# Patient Record
Sex: MALE | Race: WHITE | Employment: FULL TIME | URBAN - METROPOLITAN AREA
[De-identification: names, ages, dates, MRNs, and addresses within clinical notes are randomized per-mention and may not be internally consistent; named-entity substitution may affect disease eponyms.]

---

## 2017-05-25 NOTE — Clinical Note
HFU completed. A HFU appointment is scheduled with the [facility] on 1/21/2022. Thank you. None known

## 2022-01-04 ENCOUNTER — APPOINTMENT (OUTPATIENT)
Dept: CT IMAGING | Age: 42
DRG: 872 | End: 2022-01-04
Attending: EMERGENCY MEDICINE
Payer: COMMERCIAL

## 2022-01-04 ENCOUNTER — HOSPITAL ENCOUNTER (INPATIENT)
Facility: HOSPITAL | Age: 42
LOS: 1 days | Discharge: HOME | DRG: 872 | End: 2022-01-05
Attending: EMERGENCY MEDICINE | Admitting: HOSPITALIST
Payer: COMMERCIAL

## 2022-01-04 DIAGNOSIS — R73.9 HYPERGLYCEMIA: Primary | ICD-10-CM

## 2022-01-04 DIAGNOSIS — E11.9 TYPE 2 DIABETES MELLITUS WITHOUT COMPLICATION, UNSPECIFIED WHETHER LONG TERM INSULIN USE (HCC): ICD-10-CM

## 2022-01-04 DIAGNOSIS — R74.01 ELEVATED TRANSAMINASE LEVEL: ICD-10-CM

## 2022-01-04 DIAGNOSIS — K04.7 DENTAL ABSCESS: ICD-10-CM

## 2022-01-04 PROBLEM — L02.01 FACIAL ABSCESS: Status: ACTIVE | Noted: 2022-01-04

## 2022-01-04 LAB
ALBUMIN SERPL-MCNC: 3.4 G/DL (ref 3.4–5)
ALBUMIN/GLOB SERPL: 0.8 {RATIO} (ref 1–2)
ALP LIVER SERPL-CCNC: 173 U/L
ALT SERPL-CCNC: 107 U/L
ANION GAP SERPL CALC-SCNC: 12 MMOL/L (ref 0–18)
APTT PPP: 24.1 SECONDS (ref 23.3–35.6)
AST SERPL-CCNC: 81 U/L (ref 15–37)
BASOPHILS # BLD: 0 X10(3) UL (ref 0–0.2)
BASOPHILS NFR BLD: 0 %
BILIRUB SERPL-MCNC: 1.5 MG/DL (ref 0.1–2)
BUN BLD-MCNC: 14 MG/DL (ref 7–18)
CALCIUM BLD-MCNC: 9.3 MG/DL (ref 8.5–10.1)
CHLORIDE SERPL-SCNC: 99 MMOL/L (ref 98–112)
CO2 SERPL-SCNC: 20 MMOL/L (ref 21–32)
CREAT BLD-MCNC: 1.3 MG/DL
EOSINOPHIL # BLD: 0 X10(3) UL (ref 0–0.7)
EOSINOPHIL NFR BLD: 0 %
ERYTHROCYTE [DISTWIDTH] IN BLOOD BY AUTOMATED COUNT: 13.3 %
GLOBULIN PLAS-MCNC: 4.5 G/DL (ref 2.8–4.4)
GLUCOSE BLD-MCNC: 311 MG/DL (ref 70–99)
GLUCOSE BLD-MCNC: 320 MG/DL (ref 70–99)
HCT VFR BLD AUTO: 49.9 %
HGB BLD-MCNC: 16.4 G/DL
INR BLD: 1.1 (ref 0.8–1.2)
LACTATE SERPL-SCNC: 1.2 MMOL/L (ref 0.4–2)
LYMPHOCYTES NFR BLD: 1.63 X10(3) UL (ref 1–4)
LYMPHOCYTES NFR BLD: 12 %
MCH RBC QN AUTO: 28.4 PG (ref 26–34)
MCHC RBC AUTO-ENTMCNC: 32.9 G/DL (ref 31–37)
MCV RBC AUTO: 86.5 FL
MONOCYTES # BLD: 0.54 X10(3) UL (ref 0.1–1)
MONOCYTES NFR BLD: 4 %
MORPHOLOGY: NORMAL
NEUTROPHILS # BLD AUTO: 11.55 X10 (3) UL (ref 1.5–7.7)
NEUTROPHILS NFR BLD: 83 %
NEUTS BAND NFR BLD: 1 %
NEUTS HYPERSEG # BLD: 11.42 X10(3) UL (ref 1.5–7.7)
OSMOLALITY SERPL CALC.SUM OF ELEC: 284 MOSM/KG (ref 275–295)
PLATELET # BLD AUTO: 205 10(3)UL (ref 150–450)
PLATELET MORPHOLOGY: NORMAL
POTASSIUM SERPL-SCNC: 3.9 MMOL/L (ref 3.5–5.1)
PROT SERPL-MCNC: 7.9 G/DL (ref 6.4–8.2)
PROTHROMBIN TIME: 14 SECONDS (ref 11.6–14.8)
RBC # BLD AUTO: 5.77 X10(6)UL
SARS-COV-2 RNA RESP QL NAA+PROBE: NOT DETECTED
SODIUM SERPL-SCNC: 131 MMOL/L (ref 136–145)
TOTAL CELLS COUNTED BLD: 100
WBC # BLD AUTO: 13.6 X10(3) UL (ref 4–11)

## 2022-01-04 PROCEDURE — 70491 CT SOFT TISSUE NECK W/DYE: CPT | Performed by: EMERGENCY MEDICINE

## 2022-01-04 PROCEDURE — 99223 1ST HOSP IP/OBS HIGH 75: CPT | Performed by: HOSPITALIST

## 2022-01-04 PROCEDURE — 3046F HEMOGLOBIN A1C LEVEL >9.0%: CPT | Performed by: NURSE PRACTITIONER

## 2022-01-04 RX ORDER — KETOROLAC TROMETHAMINE 15 MG/ML
15 INJECTION, SOLUTION INTRAMUSCULAR; INTRAVENOUS ONCE
Status: COMPLETED | OUTPATIENT
Start: 2022-01-04 | End: 2022-01-04

## 2022-01-04 RX ORDER — IBUPROFEN 600 MG/1
600 TABLET ORAL ONCE
Status: COMPLETED | OUTPATIENT
Start: 2022-01-04 | End: 2022-01-04

## 2022-01-04 RX ORDER — SODIUM CHLORIDE 9 MG/ML
INJECTION, SOLUTION INTRAVENOUS ONCE
Status: COMPLETED | OUTPATIENT
Start: 2022-01-04 | End: 2022-01-04

## 2022-01-04 RX ORDER — INSULIN ASPART 100 [IU]/ML
0.15 INJECTION, SOLUTION INTRAVENOUS; SUBCUTANEOUS ONCE
Status: COMPLETED | OUTPATIENT
Start: 2022-01-04 | End: 2022-01-04

## 2022-01-04 RX ORDER — ACETAMINOPHEN 500 MG
1000 TABLET ORAL ONCE
Status: COMPLETED | OUTPATIENT
Start: 2022-01-04 | End: 2022-01-04

## 2022-01-04 RX ORDER — CLINDAMYCIN PHOSPHATE 600 MG/50ML
600 INJECTION INTRAVENOUS ONCE
Status: COMPLETED | OUTPATIENT
Start: 2022-01-04 | End: 2022-01-04

## 2022-01-05 ENCOUNTER — APPOINTMENT (OUTPATIENT)
Dept: CV DIAGNOSTICS | Facility: HOSPITAL | Age: 42
DRG: 872 | End: 2022-01-05
Attending: HOSPITALIST
Payer: COMMERCIAL

## 2022-01-05 VITALS
HEIGHT: 73 IN | WEIGHT: 278 LBS | OXYGEN SATURATION: 100 % | SYSTOLIC BLOOD PRESSURE: 133 MMHG | HEART RATE: 88 BPM | DIASTOLIC BLOOD PRESSURE: 71 MMHG | RESPIRATION RATE: 16 BRPM | BODY MASS INDEX: 36.84 KG/M2 | TEMPERATURE: 99 F

## 2022-01-05 PROBLEM — R74.01 ELEVATED TRANSAMINASE LEVEL: Status: ACTIVE | Noted: 2022-01-05

## 2022-01-05 PROBLEM — K04.7 DENTAL ABSCESS: Status: ACTIVE | Noted: 2022-01-05

## 2022-01-05 PROBLEM — R73.9 HYPERGLYCEMIA: Status: ACTIVE | Noted: 2022-01-05

## 2022-01-05 LAB
ALBUMIN SERPL-MCNC: 2.8 G/DL (ref 3.4–5)
ALBUMIN/GLOB SERPL: 0.8 {RATIO} (ref 1–2)
ALP LIVER SERPL-CCNC: 132 U/L
ALT SERPL-CCNC: 83 U/L
ANION GAP SERPL CALC-SCNC: 9 MMOL/L (ref 0–18)
AST SERPL-CCNC: 50 U/L (ref 15–37)
BASOPHILS # BLD AUTO: 0.09 X10(3) UL (ref 0–0.2)
BASOPHILS NFR BLD AUTO: 0.7 %
BILIRUB SERPL-MCNC: 1.2 MG/DL (ref 0.1–2)
BILIRUB UR QL STRIP.AUTO: NEGATIVE
BUN BLD-MCNC: 11 MG/DL (ref 7–18)
CALCIUM BLD-MCNC: 8.2 MG/DL (ref 8.5–10.1)
CHLORIDE SERPL-SCNC: 109 MMOL/L (ref 98–112)
CLARITY UR REFRACT.AUTO: CLEAR
CO2 SERPL-SCNC: 19 MMOL/L (ref 21–32)
COLOR UR AUTO: YELLOW
CREAT BLD-MCNC: 0.67 MG/DL
EOSINOPHIL # BLD AUTO: 0.09 X10(3) UL (ref 0–0.7)
EOSINOPHIL NFR BLD AUTO: 0.7 %
ERYTHROCYTE [DISTWIDTH] IN BLOOD BY AUTOMATED COUNT: 13.3 %
EST. AVERAGE GLUCOSE BLD GHB EST-MCNC: 229 MG/DL (ref 68–126)
GLOBULIN PLAS-MCNC: 3.5 G/DL (ref 2.8–4.4)
GLUCOSE BLD-MCNC: 170 MG/DL (ref 70–99)
GLUCOSE BLD-MCNC: 175 MG/DL (ref 70–99)
GLUCOSE BLD-MCNC: 183 MG/DL (ref 70–99)
GLUCOSE BLD-MCNC: 202 MG/DL (ref 70–99)
GLUCOSE UR STRIP.AUTO-MCNC: >=500 MG/DL
HBA1C MFR BLD: 9.6 % (ref ?–5.7)
HCT VFR BLD AUTO: 44 %
HGB BLD-MCNC: 14.8 G/DL
IMM GRANULOCYTES # BLD AUTO: 0.32 X10(3) UL (ref 0–1)
IMM GRANULOCYTES NFR BLD: 2.4 %
KETONES UR STRIP.AUTO-MCNC: 20 MG/DL
LEUKOCYTE ESTERASE UR QL STRIP.AUTO: NEGATIVE
LYMPHOCYTES # BLD AUTO: 1.53 X10(3) UL (ref 1–4)
LYMPHOCYTES NFR BLD AUTO: 11.6 %
MCH RBC QN AUTO: 28.6 PG (ref 26–34)
MCHC RBC AUTO-ENTMCNC: 33.6 G/DL (ref 31–37)
MCV RBC AUTO: 85.1 FL
MONOCYTES # BLD AUTO: 0.88 X10(3) UL (ref 0.1–1)
MONOCYTES NFR BLD AUTO: 6.7 %
NEUTROPHILS # BLD AUTO: 10.23 X10 (3) UL (ref 1.5–7.7)
NEUTROPHILS # BLD AUTO: 10.23 X10(3) UL (ref 1.5–7.7)
NEUTROPHILS NFR BLD AUTO: 77.9 %
NITRITE UR QL STRIP.AUTO: NEGATIVE
OSMOLALITY SERPL CALC.SUM OF ELEC: 289 MOSM/KG (ref 275–295)
PH UR STRIP.AUTO: 6 [PH] (ref 5–8)
PLATELET # BLD AUTO: 167 10(3)UL (ref 150–450)
POTASSIUM SERPL-SCNC: 4.1 MMOL/L (ref 3.5–5.1)
PROT SERPL-MCNC: 6.3 G/DL (ref 6.4–8.2)
PROT UR STRIP.AUTO-MCNC: NEGATIVE MG/DL
RBC # BLD AUTO: 5.17 X10(6)UL
SODIUM SERPL-SCNC: 137 MMOL/L (ref 136–145)
SP GR UR STRIP.AUTO: 1.03 (ref 1–1.03)
UROBILINOGEN UR STRIP.AUTO-MCNC: <2 MG/DL
WBC # BLD AUTO: 13.1 X10(3) UL (ref 4–11)

## 2022-01-05 PROCEDURE — 93306 TTE W/DOPPLER COMPLETE: CPT | Performed by: HOSPITALIST

## 2022-01-05 PROCEDURE — 99239 HOSP IP/OBS DSCHRG MGMT >30: CPT | Performed by: HOSPITALIST

## 2022-01-05 RX ORDER — CLINDAMYCIN HYDROCHLORIDE 300 MG/1
300 CAPSULE ORAL 4 TIMES DAILY
Qty: 28 CAPSULE | Refills: 0 | Status: SHIPPED | OUTPATIENT
Start: 2022-01-05 | End: 2022-01-15

## 2022-01-05 RX ORDER — PROCHLORPERAZINE EDISYLATE 5 MG/ML
5 INJECTION INTRAMUSCULAR; INTRAVENOUS EVERY 8 HOURS PRN
Status: DISCONTINUED | OUTPATIENT
Start: 2022-01-05 | End: 2022-01-05

## 2022-01-05 RX ORDER — MORPHINE SULFATE 2 MG/ML
2 INJECTION, SOLUTION INTRAMUSCULAR; INTRAVENOUS EVERY 2 HOUR PRN
Status: DISCONTINUED | OUTPATIENT
Start: 2022-01-05 | End: 2022-01-05

## 2022-01-05 RX ORDER — CLINDAMYCIN HYDROCHLORIDE 300 MG/1
300 CAPSULE ORAL 4 TIMES DAILY
Qty: 28 CAPSULE | Refills: 0 | Status: SHIPPED | OUTPATIENT
Start: 2022-01-05 | End: 2022-01-05

## 2022-01-05 RX ORDER — BLOOD SUGAR DIAGNOSTIC
STRIP MISCELLANEOUS
Qty: 50 STRIP | Refills: 6 | Status: ON HOLD | OUTPATIENT
Start: 2022-01-05 | End: 2022-01-11 | Stop reason: CLARIF

## 2022-01-05 RX ORDER — SODIUM CHLORIDE 9 MG/ML
INJECTION, SOLUTION INTRAVENOUS CONTINUOUS
Status: DISCONTINUED | OUTPATIENT
Start: 2022-01-05 | End: 2022-01-05

## 2022-01-05 RX ORDER — MORPHINE SULFATE 4 MG/ML
4 INJECTION, SOLUTION INTRAMUSCULAR; INTRAVENOUS EVERY 2 HOUR PRN
Status: DISCONTINUED | OUTPATIENT
Start: 2022-01-05 | End: 2022-01-05

## 2022-01-05 RX ORDER — BLOOD-GLUCOSE METER
EACH MISCELLANEOUS
Qty: 1 KIT | Refills: 0 | Status: SHIPPED | OUTPATIENT
Start: 2022-01-05 | End: 2022-01-07 | Stop reason: CLARIF

## 2022-01-05 RX ORDER — ACETAMINOPHEN 325 MG/1
650 TABLET ORAL EVERY 6 HOURS PRN
Status: DISCONTINUED | OUTPATIENT
Start: 2022-01-05 | End: 2022-01-05

## 2022-01-05 RX ORDER — ONDANSETRON 2 MG/ML
4 INJECTION INTRAMUSCULAR; INTRAVENOUS EVERY 6 HOURS PRN
Status: DISCONTINUED | OUTPATIENT
Start: 2022-01-05 | End: 2022-01-05

## 2022-01-05 RX ORDER — MORPHINE SULFATE 2 MG/ML
1 INJECTION, SOLUTION INTRAMUSCULAR; INTRAVENOUS EVERY 2 HOUR PRN
Status: DISCONTINUED | OUTPATIENT
Start: 2022-01-05 | End: 2022-01-05

## 2022-01-05 RX ORDER — DEXTROSE MONOHYDRATE 25 G/50ML
50 INJECTION, SOLUTION INTRAVENOUS
Status: DISCONTINUED | OUTPATIENT
Start: 2022-01-05 | End: 2022-01-05

## 2022-01-05 RX ORDER — KETOROLAC TROMETHAMINE 15 MG/ML
15 INJECTION, SOLUTION INTRAMUSCULAR; INTRAVENOUS EVERY 6 HOURS PRN
Status: DISCONTINUED | OUTPATIENT
Start: 2022-01-05 | End: 2022-01-05

## 2022-01-05 RX ORDER — KETOROLAC TROMETHAMINE 30 MG/ML
30 INJECTION, SOLUTION INTRAMUSCULAR; INTRAVENOUS EVERY 6 HOURS PRN
Status: DISCONTINUED | OUTPATIENT
Start: 2022-01-05 | End: 2022-01-05

## 2022-01-05 RX ORDER — ENOXAPARIN SODIUM 100 MG/ML
40 INJECTION SUBCUTANEOUS DAILY
Status: DISCONTINUED | OUTPATIENT
Start: 2022-01-05 | End: 2022-01-05

## 2022-01-05 NOTE — PROGRESS NOTES
Patient seen and examined. Discharge if ok with consultants. Hospitalist portion of med rec completed.     Pastor Mandi MD  [facility]  Internal Medicine Hospitalist  Cell [phone]

## 2022-01-05 NOTE — ED QUICK NOTES
Orders for admission, patient is aware of plan and ready to go upstairs. Any questions, please call ED RN Venus Jeter at extension [phone]. Vaccinated?  yes  Type of COVID test sent:rapid  COVID Suspicion level: Low      Titratable drug(s) infusing:  Rate: Saline

## 2022-01-05 NOTE — ED PROVIDER NOTES
Patient Seen in: THE [facility] Emergency Department [place]      History   Patient presents with:  Abscess  Dental Problem    Stated Complaint: Possible dental abcsess x 6 days.  + fever    Subjective:   HPI    This is a 36 old old male no significant past Pupils equally round and reactive to light. Conjuctiva clear. Oropharynx is clear and moist.  Multiple teeth in various stages of decay. Very poor dentition. Lungs: Clear to auscultation bilaterally with no rales, no retractions, and no wheezing.   HEART IV Toradol and Tylenol for pain and fever. Basic labs were obtained. CBC: White blood cell count 13. 6. Hemoglobin 16.4. Platelet 206. CMP: BUN 14. Creatinine 1.3. Glucose 311. Bicarb 20. CMP: BUN 14. Creatinine 1.3. Glucose 311. Bicarb 20.   AST

## 2022-01-05 NOTE — PAYOR COMM NOTE
--------------  ADMISSION REVIEW     Payor: Karina Bae #:  [id number]  Authorization Number: [id number]        Admit date: 1/5/22  Admit time: 12:31 AM       REVIEW DOCUMENTATION:     ED Provider Notes      ED Provider Notes signed by Kimberly Marshall Exam    GENERAL: Awake, alert oriented x3, nontoxic appearing. SKIN: Normal, warm, and dry. HEENT: Right-sided facial swelling noted. Pupils equally round and reactive to light. Conjuctiva clear.   Oropharynx is clear and moist.  Multiple teeth in vario were obtained. CBC: White blood cell count 13. 6. Hemoglobin 16.4. Platelet 768. CMP: BUN 14. Creatinine 1.3. Glucose 311. Bicarb 20. CMP: BUN 14. Creatinine 1.3. Glucose 311. Bicarb 20. AST 81. . Alk phos 173.     Rapid Covid: Negative recurrent dental abscesses for months. Last Wednesday patient began to feel swelling and pain consistent with prior abscess, but then he had fevers and chills. Patient felt better over the following few days.  Today patient again with swelling and pain to u PBNP in the last 168 hours. Creatinine Kinase  No results for input(s): CK in the last 168 hours. Inflammatory Markers  No results for input(s): CRP, MUNIR, LDH, DDIMER in the last 168 hours.     No results for input(s): TROP, TROPHS, CK in the last 168 (CLEOCIN) premix 600mg/50ml     Date Action Dose Route User    1/4/2022 1958 New Bag 600 mg Intravenous Solange Fields, RN      ibuprofen (MOTRIN) tab 600 mg     Date Action Dose Route User    Admitted on 1/4/2022 1/4/2022 1835 Given 600 mg Oral Cielo, °C) 122 16 121/69 96 % — None (Room air) — CARLOS EDUARDO    01/04/22 1834 102.8 °F (39.3 °C) 116 22 163/94 99 % — None (Room air) — SS    01/04/22 1831 — — — — — 278 lb — — SS        CIWA Scores (since admission)     None

## 2022-01-05 NOTE — ED QUICK NOTES
PT request AC IV be removed for transport to [facility]. Informed that he would need to have a new IV started upon arrival to [facility]. PT states that he does not mind needles and would prefer to have it removed now and restarted at [facility].

## 2022-01-05 NOTE — ED INITIAL ASSESSMENT (HPI)
Patient is having fever and abscess to inside of mouth. Patient also believes he has a sinus infection. Bump has been intermittent for months. Patient fever began Wednesday.

## 2022-01-05 NOTE — DISCHARGE SUMMARY
AB HOSPITALIST  DISCHARGE SUMMARY     Lenin Bartlett Patient Status:  Inpatient    1980 MRN [id number]   [facility] 3NE-A Attending Matthew Acevedo MD   Hosp Day # 1 PCP No primary care provider on file.      Date of Admission: 2022 · none    Consultants:  • Oral surgery    Discharge Medication List:     Discharge Medications      START taking these medications      Instructions Prescription details   clindamycin 300 MG Caps  Commonly known as: CLEOCIN      Take 1 capsule (300 mg to [88] 88  Resp:  [16] 16  BP: (133)/(71) 133/71    Physical Exam:    General: No acute distress. Respiratory: Clear to auscultation bilaterally. No wheezes. No rhonchi. Cardiovascular: S1, S2. Regular rate and rhythm. No murmurs, rubs or gallops.    Dorena Nash

## 2022-01-05 NOTE — H&P
[facility]ITAL[facility]  History and Physical     Lenin Bartlett Patient Status:  Emergency    1980 MRN [id number]   Location [facility] IN [place] Attending Ryan Simons, [address] [facility] Day # 0 PCP No primary care provider on file.      C gumline  Respiratory: Clear to auscultation bilaterally. Cardiovascular: S1, S2. Regular rate and rhythm. (+) murmur  Chest and Back: No tenderness or deformity. Abdomen: Soft, nontender, nondistended. Positive bowel sounds.  No rebound, guarding or org

## 2022-01-05 NOTE — PROGRESS NOTES
NURSING DISCHARGE NOTE    Discharged Home via Wheelchair. Accompanied by Support staff  Belongings Taken by patient/family.     Patient watched diabetes education video and given booklet with instructions to call the outpatient diabetes education offic

## 2022-01-05 NOTE — PROGRESS NOTES
Received pt from ED. Pt A&O x4. Pt came due to mouth abscess. MD was at beside. Pt on RA and NSR on tele. Pt has no fever. Pt denies pain at this time. Pt PIV inserted to right hand. Pt has IV fluids. Pt is NPO and aware of urine sample is needed.

## 2022-01-06 ENCOUNTER — PATIENT OUTREACH (OUTPATIENT)
Dept: CASE MANAGEMENT | Age: 42
End: 2022-01-06

## 2022-01-06 DIAGNOSIS — Z02.9 ENCOUNTERS FOR UNSPECIFIED ADMINISTRATIVE PURPOSE: ICD-10-CM

## 2022-01-06 DIAGNOSIS — R73.9 HYPERGLYCEMIA: ICD-10-CM

## 2022-01-06 NOTE — PAYOR COMM NOTE
--------------  DISCHARGE REVIEW    Payor: Jazmín Cyndy #:  [id number]  Authorization Number: [id number]        Admit date: 1/5/22  Admit time:  12:31 AM  Discharge Date: 1/5/2022  3:00 PM     Admitting Physician: Pedro Luis Alvarez MD  Attendin dm2.  Education provided prior to discharge and testing supplies and metformin prescribed. No complications. Plan for OP dental extraction and I/D likely, per dr. Hugo nichols. Outpatient follow up with oral surgery advised.     Lace+ Score: 25  59-90 Hi ELIZABETH Burt 97 [address] [facility] [address] [phone]    In 1 week      [facility]. Lesa Prescott [address] [place] [id number] [phone]  In 2 days      [address] [place] [address]

## 2022-01-06 NOTE — PROGRESS NOTES
Initial Post Discharge Follow Up   Discharge Date: 1/5/22  Contact Date: 1/6/2022    Consent Verification:  Assessment Completed With: Patient  HIPAA Verified?   Yes    Discharge Dx:  Sepsis d/t dental abscesses  Hyponatremia  Metabolic acidosis  Transam Dispense Refill   • clindamycin 300 MG Oral Cap Take 1 capsule (300 mg total) by mouth 4 (four) times daily for 7 days.  28 capsule 0   • Blood Glucose Monitoring Suppl (ONETOUCH VERIO FLEX SYSTEM) w/Device Does not apply Kit 1 kit 1 kit 0   • Glucose Blood post D/C:   Now that you are home, are there any needs or concerns you need addressed before your next visit with your PCP?  (DME, meds, disease concerns, Etc): No     Follow up appointments:      Your appointments     Date & Time Appointment Department (C [address] [facility], [address], [place]  [facility] [place]  [address] [place] [facility] [address] [phone] [facility]  [address] [place] [address]  [address] [facility] Yessy [address]vard [address] [address]

## 2022-01-07 ENCOUNTER — OFFICE VISIT (OUTPATIENT)
Dept: INTERNAL MEDICINE CLINIC | Facility: CLINIC | Age: 42
End: 2022-01-07
Payer: COMMERCIAL

## 2022-01-07 VITALS
DIASTOLIC BLOOD PRESSURE: 74 MMHG | RESPIRATION RATE: 18 BRPM | TEMPERATURE: 99 F | OXYGEN SATURATION: 98 % | HEART RATE: 112 BPM | WEIGHT: 266 LBS | SYSTOLIC BLOOD PRESSURE: 115 MMHG | BODY MASS INDEX: 35.25 KG/M2 | HEIGHT: 73 IN

## 2022-01-07 DIAGNOSIS — K04.7 DENTAL ABSCESS: Primary | ICD-10-CM

## 2022-01-07 DIAGNOSIS — E11.9 TYPE 2 DIABETES MELLITUS WITHOUT COMPLICATION, WITHOUT LONG-TERM CURRENT USE OF INSULIN (HCC): ICD-10-CM

## 2022-01-07 DIAGNOSIS — L02.01 FACIAL ABSCESS: ICD-10-CM

## 2022-01-07 PROCEDURE — 3078F DIAST BP <80 MM HG: CPT | Performed by: NURSE PRACTITIONER

## 2022-01-07 PROCEDURE — 99495 TRANSJ CARE MGMT MOD F2F 14D: CPT | Performed by: NURSE PRACTITIONER

## 2022-01-07 PROCEDURE — 3074F SYST BP LT 130 MM HG: CPT | Performed by: NURSE PRACTITIONER

## 2022-01-07 PROCEDURE — 3008F BODY MASS INDEX DOCD: CPT | Performed by: NURSE PRACTITIONER

## 2022-01-07 RX ORDER — ACETAMINOPHEN 500 MG
TABLET ORAL EVERY 6 HOURS PRN
COMMUNITY
End: 2022-01-15

## 2022-01-07 RX ORDER — IBUPROFEN 200 MG
400 TABLET ORAL EVERY 6 HOURS PRN
COMMUNITY
End: 2022-01-15

## 2022-01-07 NOTE — PROGRESS NOTES
[facility] 6      HISTORY   CHIEF COMPLAINT: HFU-Dental abscess, facial abscess, and new onset DMII.     HPI: Daniela Mayorga is a 36year old male here today for hospital follow up for dental abscess, facial abscess, a time of exam.     Interactive contact within 2 business days post discharge first initiated on Date: 1/6/2022      Allergies:    Amoxicillin             Coughing   Current Meds:  clindamycin 300 MG Oral Cap, Take 1 capsule (300 mg total) by mouth 4 (four) Suzie Otto MD on 1/04/2022 at 8:52 PM     Finalized by (CST): Hailee Carver MD on 1/04/2022 at 9:00 PM       CT ANGIOGRAPHY, CHEST (CPT=71275)    Result Date: 1/10/2022  CONCLUSION:  1. No evidence of pulmonary embolism.  2. Large pericardial effusion measuring up throat  RESPIRATORY: denies cough, denies dyspnea with exertion  CARDIOVASCULAR: denies syncope, chest pain, fatigue, palpitations   GI: denies abdominal pain, melena, constipation, + loose stools, denies diarrhea, nausea, vomiting   MUSCULOSKELETAL: denie fasting and before dinner and keep log and bring with to follow-up for review on 1/13 at 3:30pm  · Started on:   · Metformin 500mg BID-may titrate up if needed due to side effects  · DM education on 1/9 at 5:30pm  · Monitor for s/s of hyper/hypoglycemia  · living, and activities of daily living. ? Referrals as listed below in orders Assisted in scheduling required follow-up with community providers and services. Medication Reconciliation:  I am aware of an inpatient discharge within the last 30 days.   Scar Hicks

## 2022-01-07 NOTE — PROGRESS NOTES
TRANSITIONAL CARE CLINIC PHARMACIST MEDICATION RECONCILIATION        Zachrobbie Rincon MRN [id number]    1980 PCP No primary care provider on file.        Comments: Medication history completed by the [address] [place] [address] Pharmacist with the patient in information. Counseled the patient on GI side effects of Metformin. Asked patient to start checking his blood sugars before breakfast and dinner, and bring the log with him to his follow-up with us. Patient understands.     Reviewed all medications in de

## 2022-01-07 NOTE — PATIENT INSTRUCTIONS
Hospital follow up appt  Wed 2/2 9:00am at [facility] office with Dr Bishop Quiroz patient appt with Dr Juan Manuel Plaza 6/16 9:45 am

## 2022-01-10 ENCOUNTER — APPOINTMENT (OUTPATIENT)
Dept: GENERAL RADIOLOGY | Facility: HOSPITAL | Age: 42
DRG: 287 | End: 2022-01-10
Attending: EMERGENCY MEDICINE
Payer: COMMERCIAL

## 2022-01-10 ENCOUNTER — APPOINTMENT (OUTPATIENT)
Dept: INTERVENTIONAL RADIOLOGY/VASCULAR | Facility: HOSPITAL | Age: 42
DRG: 287 | End: 2022-01-10
Attending: INTERNAL MEDICINE
Payer: COMMERCIAL

## 2022-01-10 ENCOUNTER — HOSPITAL ENCOUNTER (INPATIENT)
Facility: HOSPITAL | Age: 42
LOS: 4 days | Discharge: HOME HEALTH | DRG: 287 | End: 2022-01-15
Attending: EMERGENCY MEDICINE | Admitting: HOSPITALIST
Payer: COMMERCIAL

## 2022-01-10 ENCOUNTER — APPOINTMENT (OUTPATIENT)
Dept: CT IMAGING | Facility: HOSPITAL | Age: 42
DRG: 287 | End: 2022-01-10
Attending: EMERGENCY MEDICINE
Payer: COMMERCIAL

## 2022-01-10 ENCOUNTER — APPOINTMENT (OUTPATIENT)
Dept: CV DIAGNOSTICS | Facility: HOSPITAL | Age: 42
DRG: 287 | End: 2022-01-10
Attending: INTERNAL MEDICINE
Payer: COMMERCIAL

## 2022-01-10 DIAGNOSIS — R73.9 HYPERGLYCEMIA: ICD-10-CM

## 2022-01-10 DIAGNOSIS — R94.31 ST ELEVATION: Primary | ICD-10-CM

## 2022-01-10 DIAGNOSIS — E87.1 HYPONATREMIA: ICD-10-CM

## 2022-01-10 DIAGNOSIS — I31.3 PERICARDIAL EFFUSION: ICD-10-CM

## 2022-01-10 LAB
ALBUMIN SERPL-MCNC: 2.3 G/DL (ref 3.4–5)
ALBUMIN/GLOB SERPL: 0.5 {RATIO} (ref 1–2)
ALP LIVER SERPL-CCNC: 99 U/L
ALT SERPL-CCNC: 47 U/L
ANION GAP SERPL CALC-SCNC: 15 MMOL/L (ref 0–18)
APTT PPP: 31.8 SECONDS (ref 23.3–35.6)
AST SERPL-CCNC: 41 U/L (ref 15–37)
BASOPHILS # BLD: 0 X10(3) UL (ref 0–0.2)
BASOPHILS NFR BLD: 0 %
BILIRUB SERPL-MCNC: 1.1 MG/DL (ref 0.1–2)
BUN BLD-MCNC: 25 MG/DL (ref 7–18)
CALCIUM BLD-MCNC: 8.8 MG/DL (ref 8.5–10.1)
CHLORIDE SERPL-SCNC: 92 MMOL/L (ref 98–112)
CO2 SERPL-SCNC: 17 MMOL/L (ref 21–32)
CREAT BLD-MCNC: 1.44 MG/DL
D DIMER PPP FEU-MCNC: 3.62 UG/ML FEU (ref ?–0.5)
EOSINOPHIL # BLD: 0 X10(3) UL (ref 0–0.7)
EOSINOPHIL NFR BLD: 0 %
ERYTHROCYTE [DISTWIDTH] IN BLOOD BY AUTOMATED COUNT: 13.8 %
GLOBULIN PLAS-MCNC: 5 G/DL (ref 2.8–4.4)
GLUCOSE BLD-MCNC: 459 MG/DL (ref 70–99)
HCT VFR BLD AUTO: 41 %
HGB BLD-MCNC: 13.9 G/DL
LYMPHOCYTES NFR BLD: 1.74 X10(3) UL (ref 1–4)
LYMPHOCYTES NFR BLD: 10 %
MCH RBC QN AUTO: 28.5 PG (ref 26–34)
MCHC RBC AUTO-ENTMCNC: 33.9 G/DL (ref 31–37)
MCV RBC AUTO: 84 FL
MONOCYTES # BLD: 2.26 X10(3) UL (ref 0.1–1)
MONOCYTES NFR BLD: 13 %
MORPHOLOGY: NORMAL
MYELOCYTES # BLD: 0.17 X10(3) UL
MYELOCYTES NFR BLD: 1 %
NEUTROPHILS # BLD AUTO: 13.9 X10 (3) UL (ref 1.5–7.7)
NEUTROPHILS NFR BLD: 76 %
NEUTS HYPERSEG # BLD: 13.22 X10(3) UL (ref 1.5–7.7)
NRBC BLD MANUAL-RTO: 1 %
OSMOLALITY SERPL CALC.SUM OF ELEC: 282 MOSM/KG (ref 275–295)
PLATELET # BLD AUTO: 298 10(3)UL (ref 150–450)
PLATELET MORPHOLOGY: NORMAL
POTASSIUM SERPL-SCNC: 4.8 MMOL/L (ref 3.5–5.1)
PROT SERPL-MCNC: 7.3 G/DL (ref 6.4–8.2)
RBC # BLD AUTO: 4.88 X10(6)UL
SARS-COV-2 RNA RESP QL NAA+PROBE: NOT DETECTED
SODIUM SERPL-SCNC: 124 MMOL/L (ref 136–145)
TOTAL CELLS COUNTED BLD: 100
TROPONIN I HIGH SENSITIVITY: 134 NG/L
WBC # BLD AUTO: 17.4 X10(3) UL (ref 4–11)

## 2022-01-10 PROCEDURE — 4A023N7 MEASUREMENT OF CARDIAC SAMPLING AND PRESSURE, LEFT HEART, PERCUTANEOUS APPROACH: ICD-10-PCS | Performed by: INTERNAL MEDICINE

## 2022-01-10 PROCEDURE — 93306 TTE W/DOPPLER COMPLETE: CPT | Performed by: INTERNAL MEDICINE

## 2022-01-10 PROCEDURE — B2111ZZ FLUOROSCOPY OF MULTIPLE CORONARY ARTERIES USING LOW OSMOLAR CONTRAST: ICD-10-PCS | Performed by: INTERNAL MEDICINE

## 2022-01-10 PROCEDURE — 71275 CT ANGIOGRAPHY CHEST: CPT | Performed by: EMERGENCY MEDICINE

## 2022-01-10 PROCEDURE — 0W9D3ZZ DRAINAGE OF PERICARDIAL CAVITY, PERCUTANEOUS APPROACH: ICD-10-PCS | Performed by: INTERNAL MEDICINE

## 2022-01-10 PROCEDURE — B2151ZZ FLUOROSCOPY OF LEFT HEART USING LOW OSMOLAR CONTRAST: ICD-10-PCS | Performed by: INTERNAL MEDICINE

## 2022-01-10 RX ORDER — HEPARIN SODIUM 5000 [USP'U]/ML
5000 INJECTION INTRAVENOUS; SUBCUTANEOUS ONCE
Status: COMPLETED | OUTPATIENT
Start: 2022-01-10 | End: 2022-01-10

## 2022-01-10 RX ORDER — ASPIRIN 81 MG/1
324 TABLET, CHEWABLE ORAL ONCE
Status: COMPLETED | OUTPATIENT
Start: 2022-01-10 | End: 2022-01-10

## 2022-01-10 RX ORDER — VANCOMYCIN HYDROCHLORIDE 1 G/20ML
INJECTION, POWDER, LYOPHILIZED, FOR SOLUTION INTRAVENOUS
Status: COMPLETED
Start: 2022-01-10 | End: 2022-01-10

## 2022-01-10 RX ORDER — HEPARIN SODIUM AND DEXTROSE 10000; 5 [USP'U]/100ML; G/100ML
1000 INJECTION INTRAVENOUS ONCE
Status: DISCONTINUED | OUTPATIENT
Start: 2022-01-10 | End: 2022-01-11

## 2022-01-10 RX ORDER — HEPARIN SODIUM 5000 [USP'U]/ML
INJECTION, SOLUTION INTRAVENOUS; SUBCUTANEOUS
Status: COMPLETED
Start: 2022-01-10 | End: 2022-01-10

## 2022-01-10 RX ORDER — MIDAZOLAM HYDROCHLORIDE 1 MG/ML
INJECTION INTRAMUSCULAR; INTRAVENOUS
Status: COMPLETED
Start: 2022-01-10 | End: 2022-01-10

## 2022-01-10 RX ORDER — HEPARIN SODIUM 5000 [USP'U]/ML
INJECTION, SOLUTION INTRAVENOUS; SUBCUTANEOUS
Status: DISCONTINUED
Start: 2022-01-10 | End: 2022-01-10 | Stop reason: WASHOUT

## 2022-01-10 RX ORDER — HEPARIN SODIUM AND DEXTROSE 10000; 5 [USP'U]/100ML; G/100ML
INJECTION INTRAVENOUS CONTINUOUS
Status: CANCELLED | OUTPATIENT
Start: 2022-01-11

## 2022-01-10 RX ORDER — METOPROLOL TARTRATE 5 MG/5ML
INJECTION INTRAVENOUS
Status: COMPLETED
Start: 2022-01-10 | End: 2022-01-10

## 2022-01-10 RX ORDER — NITROGLYCERIN 20 MG/100ML
INJECTION INTRAVENOUS
Status: COMPLETED
Start: 2022-01-10 | End: 2022-01-10

## 2022-01-10 RX ORDER — LIDOCAINE HYDROCHLORIDE 10 MG/ML
INJECTION, SOLUTION EPIDURAL; INFILTRATION; INTRACAUDAL; PERINEURAL
Status: COMPLETED
Start: 2022-01-10 | End: 2022-01-10

## 2022-01-10 RX ORDER — CEFAZOLIN SODIUM/WATER 2 G/20 ML
SYRINGE (ML) INTRAVENOUS
Status: COMPLETED
Start: 2022-01-10 | End: 2022-01-10

## 2022-01-10 NOTE — ED INITIAL ASSESSMENT (HPI)
Pt reports sharp shooting pain on deep inspiration, with body aches and weakness since Saturday. Appears SOB. No fevers. Negative covid last week. Recently admitted d/t abscess/infection in mouth.

## 2022-01-11 ENCOUNTER — APPOINTMENT (OUTPATIENT)
Dept: GENERAL RADIOLOGY | Facility: HOSPITAL | Age: 42
DRG: 287 | End: 2022-01-11
Attending: EMERGENCY MEDICINE
Payer: COMMERCIAL

## 2022-01-11 PROBLEM — R94.31 ST ELEVATION: Status: ACTIVE | Noted: 2022-01-11

## 2022-01-11 LAB
ALBUMIN SERPL-MCNC: 2 G/DL (ref 3.4–5)
ALBUMIN/GLOB SERPL: 0.5 {RATIO} (ref 1–2)
ALP LIVER SERPL-CCNC: 93 U/L
ALT SERPL-CCNC: 44 U/L
ANION GAP SERPL CALC-SCNC: 10 MMOL/L (ref 0–18)
AST SERPL-CCNC: 40 U/L (ref 15–37)
ATRIAL RATE: 119 BPM
BASOPHILS # BLD AUTO: 0.04 X10(3) UL (ref 0–0.2)
BASOPHILS NFR BLD AUTO: 0.3 %
BASOPHILS NFR PCAR MANUAL: 0 %
BILIRUB SERPL-MCNC: 0.7 MG/DL (ref 0.1–2)
BUN BLD-MCNC: 23 MG/DL (ref 7–18)
CALCIUM BLD-MCNC: 8.1 MG/DL (ref 8.5–10.1)
CHLORIDE SERPL-SCNC: 98 MMOL/L (ref 98–112)
CO2 SERPL-SCNC: 22 MMOL/L (ref 21–32)
COLOR FLD: YELLOW
CREAT BLD-MCNC: 1.06 MG/DL
EOSINOPHIL # BLD AUTO: 0 X10(3) UL (ref 0–0.7)
EOSINOPHIL NFR BLD AUTO: 0 %
EOSINOPHIL NFR PCAR MANUAL: 0 %
ERYTHROCYTE [DISTWIDTH] IN BLOOD BY AUTOMATED COUNT: 13.6 %
GLOBULIN PLAS-MCNC: 3.9 G/DL (ref 2.8–4.4)
GLUCOSE BLD-MCNC: 235 MG/DL (ref 70–99)
GLUCOSE BLD-MCNC: 260 MG/DL (ref 70–99)
GLUCOSE BLD-MCNC: 264 MG/DL (ref 70–99)
GLUCOSE BLD-MCNC: 333 MG/DL (ref 70–99)
GLUCOSE BLD-MCNC: 355 MG/DL (ref 70–99)
GLUCOSE BLD-MCNC: 356 MG/DL (ref 70–99)
GLUCOSE PCAR-MCNC: 361 MG/DL
HCT VFR BLD AUTO: 40.6 %
HCT VFR FLD CALC: <5 %
HGB BLD-MCNC: 12.9 G/DL
IMM GRANULOCYTES # BLD AUTO: 0.19 X10(3) UL (ref 0–1)
IMM GRANULOCYTES NFR BLD: 1.3 %
LDH FLD L TO P-CCNC: 1427 U/L
LYMPHOCYTES # BLD AUTO: 1.59 X10(3) UL (ref 1–4)
LYMPHOCYTES NFR BLD AUTO: 10.5 %
LYMPHOCYTES NFR PCAR: 1 %
MCH RBC QN AUTO: 26.9 PG (ref 26–34)
MCHC RBC AUTO-ENTMCNC: 31.8 G/DL (ref 31–37)
MCV RBC AUTO: 84.6 FL
MONOCYTES # BLD AUTO: 1.12 X10(3) UL (ref 0.1–1)
MONOCYTES NFR BLD AUTO: 7.4 %
MONOCYTES NFR PCAR: 3 %
NEUTROPHILS # BLD AUTO: 12.14 X10 (3) UL (ref 1.5–7.7)
NEUTROPHILS # BLD AUTO: 12.14 X10(3) UL (ref 1.5–7.7)
NEUTROPHILS NFR BLD AUTO: 80.5 %
NEUTROPHILS NFR PCAR: 96 %
OSMOLALITY SERPL CALC.SUM OF ELEC: 288 MOSM/KG (ref 275–295)
P AXIS: 30 DEGREES
P-R INTERVAL: 148 MS
PLATELET # BLD AUTO: 285 10(3)UL (ref 150–450)
POTASSIUM SERPL-SCNC: 4.7 MMOL/L (ref 3.5–5.1)
PROT PCAR-MCNC: 5 G/DL
PROT SERPL-MCNC: 5.9 G/DL (ref 6.4–8.2)
Q-T INTERVAL: 326 MS
QRS DURATION: 84 MS
QTC CALCULATION (BEZET): 458 MS
R AXIS: -17 DEGREES
RBC # BLD AUTO: 4.8 X10(6)UL
RBC PERICARDIAL FLUID: 3000 /MM3
SODIUM SERPL-SCNC: 130 MMOL/L (ref 136–145)
T AXIS: 46 DEGREES
TOTAL CELLS COUNTED FLD: 100
TSI SER-ACNC: 1.55 MIU/ML (ref 0.36–3.74)
VENTRICULAR RATE: 119 BPM
WBC # BLD AUTO: 15.1 X10(3) UL (ref 4–11)
WBC PERICARDIAL FLUID: 7427 /MM3

## 2022-01-11 PROCEDURE — 71045 X-RAY EXAM CHEST 1 VIEW: CPT | Performed by: EMERGENCY MEDICINE

## 2022-01-11 PROCEDURE — 99223 1ST HOSP IP/OBS HIGH 75: CPT | Performed by: HOSPITALIST

## 2022-01-11 RX ORDER — TRAMADOL HYDROCHLORIDE 50 MG/1
50 TABLET ORAL EVERY 6 HOURS PRN
Status: DISCONTINUED | OUTPATIENT
Start: 2022-01-11 | End: 2022-01-15

## 2022-01-11 RX ORDER — OMEPRAZOLE 20 MG/1
20 CAPSULE, DELAYED RELEASE ORAL
COMMUNITY
End: 2022-01-15

## 2022-01-11 RX ORDER — ATORVASTATIN CALCIUM 20 MG/1
20 TABLET, FILM COATED ORAL NIGHTLY
Status: DISCONTINUED | OUTPATIENT
Start: 2022-01-12 | End: 2022-01-15

## 2022-01-11 RX ORDER — HEPARIN SODIUM 5000 [USP'U]/ML
5000 INJECTION, SOLUTION INTRAVENOUS; SUBCUTANEOUS EVERY 12 HOURS SCHEDULED
Status: DISCONTINUED | OUTPATIENT
Start: 2022-01-11 | End: 2022-01-13

## 2022-01-11 RX ORDER — SODIUM CHLORIDE 9 MG/ML
INJECTION, SOLUTION INTRAVENOUS CONTINUOUS
Status: DISCONTINUED | OUTPATIENT
Start: 2022-01-11 | End: 2022-01-12

## 2022-01-11 RX ORDER — DEXTROSE MONOHYDRATE 25 G/50ML
50 INJECTION, SOLUTION INTRAVENOUS
Status: DISCONTINUED | OUTPATIENT
Start: 2022-01-11 | End: 2022-01-15

## 2022-01-11 RX ORDER — ASPIRIN 81 MG/1
81 TABLET ORAL DAILY
Status: DISCONTINUED | OUTPATIENT
Start: 2022-01-12 | End: 2022-01-15

## 2022-01-11 RX ORDER — HEPARIN SODIUM 5000 [USP'U]/ML
5000 INJECTION, SOLUTION INTRAVENOUS; SUBCUTANEOUS ONCE
Status: DISCONTINUED | OUTPATIENT
Start: 2022-01-12 | End: 2022-01-11

## 2022-01-11 RX ORDER — TRAMADOL HYDROCHLORIDE 50 MG/1
100 TABLET ORAL EVERY 6 HOURS PRN
Status: DISCONTINUED | OUTPATIENT
Start: 2022-01-11 | End: 2022-01-15

## 2022-01-11 RX ORDER — ACETAMINOPHEN 500 MG
TABLET ORAL EVERY 6 HOURS PRN
Status: DISCONTINUED | OUTPATIENT
Start: 2022-01-11 | End: 2022-01-15

## 2022-01-11 RX ORDER — ACETAMINOPHEN 325 MG/1
650 TABLET ORAL EVERY 4 HOURS PRN
Status: DISCONTINUED | OUTPATIENT
Start: 2022-01-11 | End: 2022-01-15

## 2022-01-11 NOTE — PROCEDURES
[facility]    PATIENT'S NAME: Barbi Sims   ATTENDING PHYSICIAN: Juan Alberto Murphy M.D. OPERATING PHYSICIAN: Donal Riojas M.D.    PATIENT ACCOUNT#:   [de-identified]    LOCATION:  [address] [place] [address]  MEDICAL RECORD #:   [id number]       DATE OF BIRTH:  1 DESCRIPTION:  After written informed consent was obtained from the patient, he was brought to cardiac catheterization laboratory. He was prepped and draped in usual sterile fashion.   Then, 1% lidocaine was used to infiltrate his right groin for local anes aneurysm. We performed right femoral artery sheathogram, but it was not acceptable for percutaneous closure due to stick at the femoral bifurcation.   Hemostasis of the right femoral artery was achieved with manual hold for 20 minutes, and there was no h Ancef. Patient received IV fluids. He was having chills and sweats, most likely getting early sepsis picture. IMPRESSION:    1.    Cardiac alert with ST-segment elevation but left ventriculogram consistent with a stress cardiomyopathy with distal dis antibiotics to complete course of antibiotic. 2.   1 g of Ancef and 1 g of vancomycin given in catheterization lab. He developed chills and was sweating, paler, but no fever in catheterization lab.   3.   Medical management of coronary artery disease with

## 2022-01-11 NOTE — H&P
AB REEDIST  History and Physical     Lenin Wallisks Patient Status:  Emergency    1980 MRN [id number]   Location [address] [facility] Attending No att. providers found   [facility] Day # 0 PCP No primary care provider on file.      C 6  metFORMIN 500 MG Oral Tab, Take 1 tablet (500 mg total) by mouth 2 (two) times daily with meals. (Patient not taking: No sig reported), Disp: 60 tablet, Rfl: 0        Review of Systems:   A comprehensive 14 point review of systems was completed.     Pert (H)).    Recent Labs   Lab 01/04/22  2159   PTP 14.0   INR 1.10       COVID-19 Lab Results    COVID-19  Lab Results   Component Value Date    COVID19 Not Detected 01/10/2022    COVID19 Not Detected 01/04/2022       Pro-Calcitonin  No results for input(s):

## 2022-01-11 NOTE — SLP NOTE
Order received due to patient with history of modified diet consistency and patient was reportedly consuming pureed solids. Met with patient at bedside who reported it was a misunderstanding and he reported he was consuming Powerade and yogurt at home.   H

## 2022-01-11 NOTE — ED PROVIDER NOTES
Patient Seen in: [facility] Emergency Department      History   Patient presents with:  Difficulty Breathing    Stated Complaint: pain with deep inspiration, body aches, generalized fatigue    Subjective:   HPI    Since saturday chest pain- mid ster General: He is not in acute distress. Appearance: He is well-developed. He is obese. He is ill-appearing and diaphoretic. Comments: Clammy cool to touch   HENT:      Head: Atraumatic.       Right Ear: External ear normal.      Left Ear: External ea BUN 25 (*)     Creatinine 1.44 (*)     GFR, Non- 59 (*)     AST 41 (*)     Albumin 2.3 (*)     Globulin  5.0 (*)     A/G Ratio 0.5 (*)     All other components within normal limits   D-DIMER - Abnormal; Notable for the following components: Notable for the following components:    POC Glucose 260 (*)     All other components within normal limits   POCT GLUCOSE - Abnormal; Notable for the following components:    POC Glucose 185 (*)     All other components within normal limits   POCT GLUCOSE body fluid type. GLUCOSE PERICARDIAL FLUID    Narrative:     A reference range has not been established for this body fluid type.        HEMATOCRIT PERICARDIAL FLUID   LDH, BODY FLUID    Narrative:     A reference range has not been established for th (Reviewed)  ------------------------------------------------------------  Time: 01/10 2158  Value: Troponin I (High Sensitivity)(!!): 134  Comment: (Reviewed)     XR CHEST AP PORTABLE  (CPT=71045)   Final Result    PROCEDURE:  XR CHEST AP PORTABLE  (CPT=71 inspiration, body aches and fatigue. CONTRAST USED:  100cc of Omnipaque 350         FINDINGS:      VASCULATURE:  No visible pulmonary arterial thrombus or attenuation. THORACIC AORTA:  No aneurysm or visible dissection.       LUNGS/PLEURA: on metformin this past week for diabetes no history of hypertension- called cardiac alert- spoe with Rajat Mishra - he is on way- straight to CTA, no PE, no Dissection but very large pericardial effusion  Admission disposition: 1/10/2022 10:03 PM         tcct e

## 2022-01-11 NOTE — PROGRESS NOTES
Progress Note  Lenin Bartlett Patient Status:  Inpatient    1980 MRN [id number]   [facility] 6NE-A Attending Matthew Acevedo MD   Hosp Day # 0 PCP No primary care provider on file.      Subjective:  Still with some mild SOB    Objective: 1/12/2022] atorvastatin  20 mg Oral Nightly   • [START ON 1/12/2022] aspirin  81 mg Oral Daily   • [START ON 1/12/2022] Heparin Sodium (Porcine)  5,000 Units Subcutaneous Once   • insulin detemir  15 Units Subcutaneous Q12H   • Insulin Aspart Pen  1-68 Uni

## 2022-01-11 NOTE — PROGRESS NOTES
01/10/22 2100   Clinical Encounter Type   Visited With Health care provider  (Per staff member,  not needed at this time.)   Referral From Other (Comment)  (paged for cardiac alert)   Spiritual care remains available at pager [phone].

## 2022-01-11 NOTE — CONSULTS
INFECTIOUS DISEASE CONSULTATION    Lenin Bartlett Patient Status:  Inpatient    1980 MRN [id number]   [facility] 6NE-A Attending Eloy Duncan MD   Hosp Day # 0 PCP No primary care prov Once  •  acetaminophen (TYLENOL EXTRA STRENGTH) tab 500-1,000 mg, 500-1,000 mg, Oral, Q6H PRN  •  glucose (DEX4) oral liquid 15 g, 15 g, Oral, Q15 Min PRN **OR** glucose-vitamin C (DEX-4) chewable tab 4 tablet, 4 tablet, Oral, Q15 Min PRN **OR** dextrose 5 intact. Neck: No swelling, no masses  Respiratory: Non labored, symmetric exursion  Drain in place  Abdomen: Soft, nontender, nondistended. Musculoskeletal: Full range of motion of all extremities. No swelling noted.   Joints: no effusions  Skin: No le with chest pain ST elevation, myocardial dysfunction, but no acute occlusion  Recent dental abscess that was treated with clindamycin    Per pathologist jenn shaped organisms  Gram stain negative, culures pending    Await blood cultures, pericardial fluid c

## 2022-01-11 NOTE — PROGRESS NOTES
01/11/22 6413   Clinical Encounter Type   Visited With Patient not available  (Pr sleeping soundly, despite repeated attempts)   Routine Visit Follow-up   Continue Visiting Yes

## 2022-01-11 NOTE — PLAN OF CARE
Assumed care of pt at approximately 0030 when admitted to unit from cath lab. A&Ox4, pericardial drain in place, R groin is C/D/I. Flat per protocol, sat up at 0300. Dr. Krystal Lazo notified about high blood sugar, ordered single dose 10 units of novolog.  Passed

## 2022-01-11 NOTE — PROCEDURES
Full procedure was dictated.     Procedure performed:  -Emergent coronary angiography  -LV gram  -Emergent pericardial effusion tap/pericardiocentesis from subxiphoid approach-850 cc yellowish fluid with no blood was drained suspicion for infection  -Fluoro angiographically  -Right posterior descending artery with distal moderate disease and sever small disease very distally    LV gram revealed moderate to severe reduction in LV systolic function with LVEF of 30% with Takotsubo appearance and dyskinetic infer

## 2022-01-11 NOTE — ED QUICK NOTES
Spouse, Elder Radha, updated over the phone per patient's request. Requesting updates as they become available at [phone].

## 2022-01-11 NOTE — DIETARY NOTE
[facility]    NUTRITION ASSESSMENT    Pt does not meet malnutrition criteria. NUTRITION INTERVENTION:    1. Meal and Snacks - monitor patient po intake. Encourage adequate po of appropriate diet.  Diet liberalized to 2200 Carb Controlled / Cardiac d RELATED HISTORY:   Appetite: Poor  Intake: <50%  Intake Meeting Needs: No, but supplements to maximize  Food Allergies: No  Cultural/Ethnic/Rastafarian Preferences Addresses: Yes    GI SYSTEM REVIEW: WNL    NUTRITION RELATED PHYSICAL FINDINGS:     1. Body Fa

## 2022-01-11 NOTE — CONSULTS
[facility] DONATO  Report of Consultation    Holly Wilcox Patient Status:  Emergency    1980 MRN [id number]   Location [address] [facility] Attending No att. providers found   Hosp Day # 0 PCP No primary care provider on fatty liver. He has newly diagnosed diabetes, he smokes e-cigarettes and his father had coronary angioplasty.     Creatinine 1.44  Sodium 124  Glucose 259  Potassium 4.8   Unremarkable liver enzymes  Leukocytosis WBC 17.4 with left shift and neutrophils 73\", weight 263 lb (119.3 kg), SpO2 97 %.   Temp (24hrs), Av.1 °F (37.3 °C), Min:99.1 °F (37.3 °C), Max:99.1 °F (37.3 °C)    Wt Readings from Last 3 Encounters:  01/10/22 : 263 lb (119.3 kg)  22 : 266 lb (120.7 kg)  22 : 278 lb (126.1 kg) of Omnipaque 350  FINDINGS:  Please note that CT may not be sensitive for oral mucosal lesions. Streak artifact from dental hardware also limits assessment.   There is a large area lucency involving right paracentral maxilla centered on the roots of the in multislice CT angiography is performed through the pulmonary arterial anatomy. 3D volume renderings are generated. Dose reduction techniques were used.  Dose information is transmitted to the ACR ([address] [place] [address] of Radiology) Luna Martinez [address] [facility] Radiology [place] 01/10/2022    CREATSERUM 1.44 01/10/2022    BUN 25 01/10/2022     01/10/2022    K 4.8 01/10/2022    CL 92 01/10/2022    CO2 17.0 01/10/2022     01/10/2022    CA 8.8 01/10/2022    ALB 2.3 01/10/2022    ALKPHO 99 01/10/2022    BILT 1.1 01/10/202 pericardiocentesis and will obtain stat echo and Lab but at present time will taking patient to Cath Lab to define coronary anatomy first  -Further recommendation pending coronary angiography findings and echo findings    Patient was consented.  The risks a

## 2022-01-11 NOTE — PROGRESS NOTES
Seen and examined; cultures pending; defer to ID if any further abx needed for dental infection and/or possible pericardial infection. CHF meds per cards. Insulin titrated.

## 2022-01-12 ENCOUNTER — APPOINTMENT (OUTPATIENT)
Dept: CV DIAGNOSTICS | Facility: HOSPITAL | Age: 42
DRG: 287 | End: 2022-01-12
Attending: NURSE PRACTITIONER
Payer: COMMERCIAL

## 2022-01-12 LAB
ANION GAP SERPL CALC-SCNC: 6 MMOL/L (ref 0–18)
ATRIAL RATE: 83 BPM
BUN BLD-MCNC: 17 MG/DL (ref 7–18)
CALCIUM BLD-MCNC: 8.3 MG/DL (ref 8.5–10.1)
CHLORIDE SERPL-SCNC: 96 MMOL/L (ref 98–112)
CO2 SERPL-SCNC: 27 MMOL/L (ref 21–32)
CREAT BLD-MCNC: 0.86 MG/DL
ERYTHROCYTE [DISTWIDTH] IN BLOOD BY AUTOMATED COUNT: 13.8 %
GLUCOSE BLD-MCNC: 185 MG/DL (ref 70–99)
GLUCOSE BLD-MCNC: 186 MG/DL (ref 70–99)
GLUCOSE BLD-MCNC: 220 MG/DL (ref 70–99)
GLUCOSE BLD-MCNC: 261 MG/DL (ref 70–99)
GLUCOSE BLD-MCNC: 274 MG/DL (ref 70–99)
HCT VFR BLD AUTO: 39.4 %
HGB BLD-MCNC: 12.4 G/DL
ISTAT ACTIVATED CLOTTING TIME: 172 SECONDS (ref 74–137)
MCH RBC QN AUTO: 27.4 PG (ref 26–34)
MCHC RBC AUTO-ENTMCNC: 31.5 G/DL (ref 31–37)
MCV RBC AUTO: 87.2 FL
OSMOLALITY SERPL CALC.SUM OF ELEC: 279 MOSM/KG (ref 275–295)
P AXIS: 33 DEGREES
P-R INTERVAL: 160 MS
PLATELET # BLD AUTO: 383 10(3)UL (ref 150–450)
POTASSIUM SERPL-SCNC: 4.6 MMOL/L (ref 3.5–5.1)
Q-T INTERVAL: 378 MS
QRS DURATION: 104 MS
QTC CALCULATION (BEZET): 444 MS
R AXIS: -19 DEGREES
RBC # BLD AUTO: 4.52 X10(6)UL
SODIUM SERPL-SCNC: 129 MMOL/L (ref 136–145)
T AXIS: 48 DEGREES
VENTRICULAR RATE: 83 BPM
WBC # BLD AUTO: 9.7 X10(3) UL (ref 4–11)

## 2022-01-12 PROCEDURE — 93308 TTE F-UP OR LMTD: CPT | Performed by: NURSE PRACTITIONER

## 2022-01-12 RX ORDER — MORPHINE SULFATE 2 MG/ML
0.5 INJECTION, SOLUTION INTRAMUSCULAR; INTRAVENOUS EVERY 2 HOUR PRN
Status: DISCONTINUED | OUTPATIENT
Start: 2022-01-12 | End: 2022-01-15

## 2022-01-12 RX ORDER — MORPHINE SULFATE 2 MG/ML
2 INJECTION, SOLUTION INTRAMUSCULAR; INTRAVENOUS EVERY 2 HOUR PRN
Status: DISCONTINUED | OUTPATIENT
Start: 2022-01-12 | End: 2022-01-15

## 2022-01-12 RX ORDER — MORPHINE SULFATE 2 MG/ML
1 INJECTION, SOLUTION INTRAMUSCULAR; INTRAVENOUS EVERY 2 HOUR PRN
Status: DISCONTINUED | OUTPATIENT
Start: 2022-01-12 | End: 2022-01-15

## 2022-01-12 NOTE — PLAN OF CARE
Assumed care of pt at approximately 1930. VSS on tele, groin site is C/D/I. Pericardial drain in place, draining to gravity. Pt requested PRN tylenol for pain, pain subsided.  Blood glucose elevated, hospitalist notified about consistently high sugars, no n

## 2022-01-12 NOTE — PLAN OF CARE
Pericardial open to gravity. drainage yellow/orange colored and blood tinged, 150ml out today. ID cons, Unasyn ordered for suspected infection in pericardial fluid. Insulin coverage adjusted for elevated blood sugar levels.  Pt able to ambulate at end of sh

## 2022-01-12 NOTE — PROGRESS NOTES
Subjective: In bed on exam, getting echo. C/o L chest/shoulder pain, pericardial drain in place - approx 50cc overnight.     Objective:   BP 98/71 (BP Location: Left arm)   Pulse 85   Temp 97.3 °F (36.3 °C) (Temporal)   Resp 19   Ht 6' 1\" (1.854 m ejection fraction was 40%. Hypokinesis of the apical myocardium. 2. Right ventricle: The cavity size was moderately to markedly decreased. After the pericardiocentesis the right ventricle was normal in size, preserved function with apical hypokinesis. 1/11/22 - drain 165/past 24hours  · Stress CM/Takotsubo with distal CAD  · Recent hospitalization with maxillary abscess - Tx w/clindamycin  · Newly diagnosed type II DM  · Acute renal insufficiency - improving, Crt pend this am  · D dimer positive, no PE

## 2022-01-12 NOTE — PROGRESS NOTES
[facility]                INFECTIOUS DISEASE PROGRESS NOTE    Na Perdomo Patient Status:  Inpatient    1980 MRN [id number]   [facility] 6NE-A Attending Nayla Pritchard MD   Hosp Day # 1 PCP No primary care provider on file. --   --     < > = values in this interval not displayed.          Recent Labs   Lab 01/10/22  2053 01/11/22  0421 01/12/22  0835   * 355* 274*   BUN 25* 23* 17   CREATSERUM 1.44* 1.06 0.86   GFRAA 68 99 123   GFRNAA 59* 86 106   CA 8.8 8.1* 8.3*

## 2022-01-12 NOTE — PAYOR COMM NOTE
--------------  ADMISSION REVIEW     Payor: Danielle Shultz #:  [id number]  Authorization Number: [id number]     Admit date: 1/11/22  Admit time:  1:24 AM              H&P - H&P Note      H&P signed by Christian Bates MD at 1/11/2022  4:43 AM needed for Pain., Disp: , Rfl:   acetaminophen 500 MG Oral Tab, Take 500-1,000 mg by mouth every 6 (six) hours as needed for Pain., Disp: , Rfl:   clindamycin 300 MG Oral Cap, Take 1 capsule (300 mg total) by mouth 4 (four) times daily for 7 days. , Disp: 2 01/04/22  1952 01/05/22  0817 01/10/22  2053   * 202* 459*   BUN 14 11 25*   CREATSERUM 1.30 0.67* 1.44*   GFRAA 77 136 68   GFRNAA 67 118 59*   CA 9.3 8.2* 8.8   ALB 3.4 2.8* 2.3*   * 137 124*   K 3.9 4.1 4.8   CL 99 109 92*   CO2 20.0* 19.0* pericardiocentesis     Patient developed chills in cath lab. No fever and he was sweating, pale. Tachycardic but not hypotensive.   Desaturating but 92 to 95% on 5 L of oxygen through nasal cannula     Preoperative diagnosis  -Ongoing pleuritic chest pain R PDA disease with compensatory hyperdynamic base of the heart     No significant MR angiographically. No thoracic aortic aneurysm     Hemostasis of the right femoral artery -achieved with manual hold.   No bleeding or hematoma.     Plan:  -Sent pericardia Family history positive for early coronary artery disease. Father had coronary angioplasty. POSTOPERATIVE DIAGNOSIS:    1. Stress cardiomyopathy/takotsubo with distal coronary artery disease.    2.          Large pericardial effusion with tamp pressure was 102/49/73 mmHg with heart rate in 120s, and he was in sinus rhythm with no prognostically significant arrhythmia.     SELECTIVE CORONARY ANGIOGRAPHY FINDINGS:    1. Left main coronary artery had no disease angiographically.   LAD arter performed urgent pericardiocentesis. Patient was prepped and draped in the usual sterile fashion in the subxiphoid area. We anesthetized subxiphoid area with 1% lidocaine, and we used pericardiocentesis tray.   The stylet was used to enter pericardial spa ventriculogram revealed moderate to severe reduction in LV systolic function with LVEF of 30% and takotsubo stress cardiomyopathy-like appearance with additional dyskinetic inferoapical segment, possible from distal right PDA disease with compensatory hype pressure, should improve now after emergent pericardiocentesis. 7.          Follow metabolic panel and CBC. 8.          Blood cultures. 9.          We discontinued IV heparin drip from ER. 10.        Infectious Disease consult for IV antibiotics. membranes moist.   Neck: No JVD  Cardiac: Regular rate and rhythm, normal S1,S2  Lungs: diminsihed  Abd: Soft, non tender, non distended  Ext: No edema  Skin: Warm, dry  Neuro: No focal deficits  Pericardial drain with about 100 ml - dark serosanguinous fl DAY:  acetaminophen (TYLENOL EXTRA STRENGTH) tab 500-1,000 mg     Date Action Dose Route User    1/11/2022 2121 Given 500 mg Oral Melodie Garrett RN      Ampicillin-Sulbactam Sodium (UNASYN) 3 g in sodium chloride 0.9% 100 mL IVPB-ADDV     Date Action Do 9183 Given 2 mg Intravenous Carollynn Spurling, RN      Perflutren Lipid Microsphere (DEFINITY) 6.52 MG/ML injection 1.5 mL     Date Action Dose Route User    1/12/2022 1047 Given 1.5 mL Intravenous Moiz Lynch I      traMADol (ULTRAM) tab 50 mg     D 23 121/66 94 % — — — ML    01/11/22 1000 — 97 23 116/81 95 % — — — ML    01/11/22 0900 — 97 27 108/66 94 % — — — ML    01/11/22 0800 97.8 °F (36.6 °C) 101 18 104/73 95 % — None (Room air) — ML    01/11/22 0700 — 101 29 108/72 93 % — — — ML    01/11/22 0600

## 2022-01-12 NOTE — PROGRESS NOTES
01/12/22 1048   Clinical Encounter Type   Visited With Patient   Continue Visiting No   Referral To Nurse  ( provided POA form to patient.  Patient will discuss about it with patient's family.)   Patient Spiritual Encounters   Spiritual Assessmen

## 2022-01-13 LAB
ANION GAP SERPL CALC-SCNC: 6 MMOL/L (ref 0–18)
APTT PPP: 28 SECONDS (ref 23.3–35.6)
APTT PPP: 31.9 SECONDS (ref 23.3–35.6)
BUN BLD-MCNC: 14 MG/DL (ref 7–18)
CALCIUM BLD-MCNC: 8 MG/DL (ref 8.5–10.1)
CHLORIDE SERPL-SCNC: 98 MMOL/L (ref 98–112)
CHOLEST SERPL-MCNC: 94 MG/DL (ref ?–200)
CO2 SERPL-SCNC: 24 MMOL/L (ref 21–32)
CREAT BLD-MCNC: 0.77 MG/DL
ERYTHROCYTE [DISTWIDTH] IN BLOOD BY AUTOMATED COUNT: 13.6 %
ERYTHROCYTE [DISTWIDTH] IN BLOOD BY AUTOMATED COUNT: 13.8 %
GLUCOSE BLD-MCNC: 154 MG/DL (ref 70–99)
GLUCOSE BLD-MCNC: 218 MG/DL (ref 70–99)
GLUCOSE BLD-MCNC: 229 MG/DL (ref 70–99)
GLUCOSE BLD-MCNC: 257 MG/DL (ref 70–99)
GLUCOSE BLD-MCNC: 279 MG/DL (ref 70–99)
HCT VFR BLD AUTO: 38 %
HCT VFR BLD AUTO: 38.7 %
HDLC SERPL-MCNC: 14 MG/DL (ref 40–59)
HGB BLD-MCNC: 12.4 G/DL
HGB BLD-MCNC: 12.6 G/DL
LDLC SERPL CALC-MCNC: 57 MG/DL (ref ?–100)
MCH RBC QN AUTO: 27.9 PG (ref 26–34)
MCH RBC QN AUTO: 27.9 PG (ref 26–34)
MCHC RBC AUTO-ENTMCNC: 32.6 G/DL (ref 31–37)
MCHC RBC AUTO-ENTMCNC: 32.6 G/DL (ref 31–37)
MCV RBC AUTO: 85.4 FL
MCV RBC AUTO: 85.8 FL
NONHDLC SERPL-MCNC: 80 MG/DL (ref ?–130)
OSMOLALITY SERPL CALC.SUM OF ELEC: 275 MOSM/KG (ref 275–295)
PLATELET # BLD AUTO: 429 10(3)UL (ref 150–450)
PLATELET # BLD AUTO: 447 10(3)UL (ref 150–450)
POTASSIUM SERPL-SCNC: 4.6 MMOL/L (ref 3.5–5.1)
RBC # BLD AUTO: 4.45 X10(6)UL
RBC # BLD AUTO: 4.51 X10(6)UL
SODIUM SERPL-SCNC: 128 MMOL/L (ref 136–145)
TRIGL SERPL-MCNC: 122 MG/DL (ref 30–149)
VLDLC SERPL CALC-MCNC: 18 MG/DL (ref 0–30)
WBC # BLD AUTO: 11.3 X10(3) UL (ref 4–11)
WBC # BLD AUTO: 12.5 X10(3) UL (ref 4–11)

## 2022-01-13 PROCEDURE — 99232 SBSQ HOSP IP/OBS MODERATE 35: CPT | Performed by: HOSPITALIST

## 2022-01-13 RX ORDER — HEPARIN SODIUM 5000 [USP'U]/ML
30 INJECTION, SOLUTION INTRAVENOUS; SUBCUTANEOUS ONCE
Status: COMPLETED | OUTPATIENT
Start: 2022-01-13 | End: 2022-01-13

## 2022-01-13 RX ORDER — HEPARIN SODIUM AND DEXTROSE 10000; 5 [USP'U]/100ML; G/100ML
18 INJECTION INTRAVENOUS ONCE
Status: COMPLETED | OUTPATIENT
Start: 2022-01-13 | End: 2022-01-13

## 2022-01-13 RX ORDER — METOPROLOL SUCCINATE 50 MG/1
50 TABLET, EXTENDED RELEASE ORAL
Status: DISCONTINUED | OUTPATIENT
Start: 2022-01-13 | End: 2022-01-15

## 2022-01-13 RX ORDER — HEPARIN SODIUM 5000 [USP'U]/ML
80 INJECTION INTRAVENOUS; SUBCUTANEOUS ONCE
Status: COMPLETED | OUTPATIENT
Start: 2022-01-13 | End: 2022-01-13

## 2022-01-13 RX ORDER — HEPARIN SODIUM AND DEXTROSE 10000; 5 [USP'U]/100ML; G/100ML
INJECTION INTRAVENOUS CONTINUOUS
Status: DISCONTINUED | OUTPATIENT
Start: 2022-01-13 | End: 2022-01-14

## 2022-01-13 RX ORDER — LOSARTAN POTASSIUM 25 MG/1
25 TABLET ORAL DAILY
Status: DISCONTINUED | OUTPATIENT
Start: 2022-01-14 | End: 2022-01-15

## 2022-01-13 NOTE — PLAN OF CARE
Echocardiogram done this am. Percardial drain removed by Dr. Marietta Skinner this afternoon. Aspirin & Heparin held today, ok to resume tomorrow per Dr. Marietta Skinner. Pt's chest comfort is alleviated after drain is removed, still with some discomfort to left shoulder.

## 2022-01-13 NOTE — PROGRESS NOTES
[facility]                INFECTIOUS DISEASE PROGRESS NOTE    Cintia Elliott Patient Status:  Inpatient    1980 MRN [id number]   [facility] 6NE-A Attending Anayeli Fermin MD   Hosp Day # 2 PCP No primary care provider on file. 1*  --   --   --   --     < > = values in this interval not displayed.          Recent Labs   Lab 01/10/22  2053 01/10/22  2053 01/11/22  0421 01/12/22  0835 01/13/22  0909   *   < > 355* 274* 257*   BUN 25*   < > 23* 17 14   CREATSERUM 1.44*   < > 1 Consultants  [phone]

## 2022-01-13 NOTE — PAYOR COMM NOTE
--------------  CONTINUED STAY REVIEW----REQUESTING ADDITIONAL DAYS 1/12 AND 1/13      Payor: Juan Roles #:  [id number]  Authorization Number: [id number]     Admit date: 1/11/22  Admit time:  1:24 AM    Admitting Physician: Akshat Rowley MD [id number]   NEUT 76  --   --   --   --    LYMPH 10  --   --   --   --    MON 13  --   --   --   --    EOS 0  --   --   --   --    NRBC 1*  --   --   --   --     < > = values in this interval not displayed.                  Recent Labs   Lab 01/10/22  2053 01/1 ok walked in halls, ate well  Breathing ok      Objective:    Review of Systems:   10 point ROS completed and was negative, except for pertinent positive and negatives stated in subjective.     Vital signs:  Temp:  [97.1 °F (36.2 °C)-98.3 °F (36.8 °C)] 97. hours.     Cardiac  No results for input(s): TROP, PBNP in the last 168 hours.     Creatinine Kinase  No results for input(s): CK in the last 168 hours.     Inflammatory Markers      Recent Labs   Lab 01/10/22  2053   DDIMER 3.62*             Recent Labs Documentation:      Quality:  · DVT Prophylaxis:  Heparin   · CODE status:  Full   · Blakely:   · Central line:  ·       Will the patient be referred to [facility] on discharge?:   Estimated date of discharge:   Discharge is dependent on:    At this point Mr. Hillary Cardenas 3 Units Subcutaneous (Right Upper Arm) Obey Bradshaw RN      insulin detemir (LEVEMIR) 100 UNIT/ML flextouch 18 Units     Date Action Dose Route User    1/13/2022 0816 Given 18 Units Subcutaneous (Right Upper Abdomen) Eileen Davidson RN    1/12/2022 202 01/12/22 2300 — 90 25 111/72 92 % — — — CR    01/12/22 2200 — 91 29 108/66 92 % — — — CR    01/12/22 2100 — 88 26 104/72 92 % — — — CR    01/12/22 2000 97.9 °F (36.6 °C) 88 21 106/72 94 % — None (Room air) — CR    01/12/22 1900 — 85 25 103/74 94 % — — —

## 2022-01-13 NOTE — PROGRESS NOTES
Subjective: In bed on exam, still with  C/o L chest/shoulder pain - slightly improved since pericardial drain removed yesterday. He currently destinee SOB, dizziness, or palpitations.     Objective:   BP 95/82 (BP Location: Left arm)   Pulse 95   Temp 2. Regional wall motion abnormality: Akinesis of the apical inferior, apical septal, apical lateral, and apical myocardium. 3. Left atrium: The left atrial volume was normal.   4. Right ventricle:  The cavity size was normal. Systolic function was  norm drain removed 1/12  · Stress CM/Takotsubo - EF 40%  · Distal CAD - on Asa/statin  · LV apical thrombus -on Heparin  · Recent hospitalization with maxillary abscess - Tx w/clindamycin  · Newly diagnosed type II DM  · Acute renal insufficiency -resolved, Crt

## 2022-01-13 NOTE — PROGRESS NOTES
[facility]     Hospitalist Progress Note     Daniela Harrismartha Patient Status:  Inpatient    1980 MRN [id number]   [facility] 6NE-A Attending Rylee Campo MD   Hosp Day # 2 PCP No primary care provider on file.      Chief Complaint:  Ches results for input(s): PTP, INR in the last 168 hours.          COVID-19 Lab Results    COVID-19  Lab Results   Component Value Date    COVID19 Not Detected 01/10/2022    COVID19 Not Detected 01/04/2022       Pro-Calcitonin  No results for input(s): PCT in t appt [facility]/ [facility]  Sent home on metformin last admission   Wants [facility]p at [facility] remember name at present      Wife called w/ update   Plan of care discussed with  RN, pt     Loren Amador, NP    Supplementary Documentation:     Mohamud Boo

## 2022-01-13 NOTE — PLAN OF CARE
Assumed care of pt at approximately 1930. VSS, on RA. Groin and chest site are C/D/I, soft, no hematomas. Ice packs, PRN pain medication used for shoulder pain multiple times throughout shift. Will continue to monitor.

## 2022-01-14 ENCOUNTER — APPOINTMENT (OUTPATIENT)
Dept: CV DIAGNOSTICS | Facility: HOSPITAL | Age: 42
DRG: 287 | End: 2022-01-14
Attending: NURSE PRACTITIONER
Payer: COMMERCIAL

## 2022-01-14 LAB
ANION GAP SERPL CALC-SCNC: 7 MMOL/L (ref 0–18)
APTT PPP: 53 SECONDS (ref 23.3–35.6)
APTT PPP: 55.4 SECONDS (ref 23.3–35.6)
BUN BLD-MCNC: 10 MG/DL (ref 7–18)
CALCIUM BLD-MCNC: 8.6 MG/DL (ref 8.5–10.1)
CHLORIDE SERPL-SCNC: 96 MMOL/L (ref 98–112)
CO2 SERPL-SCNC: 27 MMOL/L (ref 21–32)
CREAT BLD-MCNC: 0.97 MG/DL
ERYTHROCYTE [DISTWIDTH] IN BLOOD BY AUTOMATED COUNT: 13.8 %
GLUCOSE BLD-MCNC: 139 MG/DL (ref 70–99)
GLUCOSE BLD-MCNC: 170 MG/DL (ref 70–99)
GLUCOSE BLD-MCNC: 196 MG/DL (ref 70–99)
GLUCOSE BLD-MCNC: 237 MG/DL (ref 70–99)
GLUCOSE BLD-MCNC: 264 MG/DL (ref 70–99)
HCT VFR BLD AUTO: 41.1 %
HGB BLD-MCNC: 12.9 G/DL
MAGNESIUM SERPL-MCNC: 2.3 MG/DL (ref 1.6–2.6)
MCH RBC QN AUTO: 27.4 PG (ref 26–34)
MCHC RBC AUTO-ENTMCNC: 31.4 G/DL (ref 31–37)
MCV RBC AUTO: 87.4 FL
OSMOLALITY SERPL CALC.SUM OF ELEC: 277 MOSM/KG (ref 275–295)
PLATELET # BLD AUTO: 526 10(3)UL (ref 150–450)
POTASSIUM SERPL-SCNC: 4.7 MMOL/L (ref 3.5–5.1)
RBC # BLD AUTO: 4.7 X10(6)UL
SODIUM SERPL-SCNC: 130 MMOL/L (ref 136–145)
WBC # BLD AUTO: 13 X10(3) UL (ref 4–11)

## 2022-01-14 PROCEDURE — 93308 TTE F-UP OR LMTD: CPT | Performed by: NURSE PRACTITIONER

## 2022-01-14 PROCEDURE — 99233 SBSQ HOSP IP/OBS HIGH 50: CPT | Performed by: HOSPITALIST

## 2022-01-14 PROCEDURE — 02HV33Z INSERTION OF INFUSION DEVICE INTO SUPERIOR VENA CAVA, PERCUTANEOUS APPROACH: ICD-10-PCS | Performed by: HOSPITALIST

## 2022-01-14 RX ORDER — LIDOCAINE HYDROCHLORIDE 10 MG/ML
5 INJECTION, SOLUTION EPIDURAL; INFILTRATION; INTRACAUDAL; PERINEURAL
Status: COMPLETED | OUTPATIENT
Start: 2022-01-14 | End: 2022-01-14

## 2022-01-14 RX ORDER — TEMAZEPAM 7.5 MG/1
7.5 CAPSULE ORAL NIGHTLY PRN
Status: DISCONTINUED | OUTPATIENT
Start: 2022-01-14 | End: 2022-01-15

## 2022-01-14 NOTE — PROGRESS NOTES
[address]  Progress Note    Antonio Mcfadden Patient Status:  Inpatient    1980 MRN [id number]   [facility] 6NE-A Attending Ann Marie Martin MD   Hosp Day # 3 PCP No primary care provider on file.      Subjective: Patient is si intact. Normal sensation and motor function. No focal deficits. Musculoskeletal: normal range of motion, normal muscle strength, no joint effusion. Integumentary: Warm and dry skin. No rashes. Psychiatric: no depression. Normal affect.      Laboratory/D likely represents a periapical abscess. There are numerous other similar periapical abscesses such as within the right mandible involving the root of the right posterior molars in this region and involving the left mandibular premolars.   There also tatum transcribed by Technologist)  Pain with deep inspiration, body aches and fatigue. CONTRAST USED:  100cc of Omnipaque 350  FINDINGS:  VASCULATURE:  No visible pulmonary arterial thrombus or attenuation. THORACIC AORTA:  No aneurysm or visible dissection. silhouette is again enlarged. There is retrocardiac airspace disease. There is a mild left pleural effusion. No measurable pneumothorax. CONCLUSION:  1. Mild left pleural effusion.  2. Persistent retrocardiac airspace disease may represent ate Aspart Pen (NOVOLOG) 100 UNIT/ML flexpen 2-10 Units, 2-10 Units, Subcutaneous, TID CC and HS  Ampicillin-Sulbactam Sodium (UNASYN) 3 g in sodium chloride 0.9% 100 mL IVPB-ADDV, 3 g, Intravenous, Q8H        Allergies:    Amoxicillin             Coughing days prior to admission and was started on metformin -hemoglobin A1c -glucose 459 on admission  · Acute renal insufficiency - creatinine 1.44 on admission normalized after pericardiocentesis  · D dimer positive from infection/inflammation, no PE per CTA in sure it is affordable    Addendum:  1+ growth Prevotella denticola Abnormal culture from pericardial effusion -with infected pericarditis and multiple maxillary abscesses patient may require longer treatment with IV antibiotic including placement of PICC l

## 2022-01-14 NOTE — PROGRESS NOTES
[facility]     Hospitalist Progress Note     Zach Sergey Patient Status:  Inpatient    1980 MRN [id number]   [facility] 6NE-A Attending Fawn Tejada MD   Hosp Day # 3 PCP No primary care provider on file.      Chief Complaint: chest Results    COVID-19  Lab Results   Component Value Date    COVID19 Not Detected 01/10/2022    COVID19 Not Detected 01/04/2022       Pro-Calcitonin  No results for input(s): PCT in the last 168 hours.     Cardiac  No results for input(s): TROP, PBNP in the l

## 2022-01-14 NOTE — PLAN OF CARE
0730-received pt awake alert oriented echo in process.   Monitor sr, heparin gtt at 2600 units/hr-awaiting ptt draw  Denies pain at this time  Discuss plan of care increase activity as able, diabetes videos for teaching reinforcement  0830-ambulate to bathr

## 2022-01-14 NOTE — PROGRESS NOTES
[facility]                INFECTIOUS DISEASE PROGRESS NOTE    Arthur Ruvalcaba Patient Status:  Inpatient    1980 MRN [id number]   [facility] 6NE-A Attending Napoleon Shearer MD   Hosp Day # 3 PCP No primary care provider on file. --   --   --     < > = values in this interval not displayed.          Recent Labs   Lab 01/10/22  2053 01/10/22  2053 01/11/22  0421 01/11/22  0421 01/12/22  0835 01/13/22  0909 01/14/22  0912   *   < > 355*   < > 274* 257* 237*   BUN 25*   < > 23* drain  Prevotella DENTICola from anaerobic culture    Mural thrombosis on anticoagualtion    PICC line to be placed  Dc planning home ivabx 4-6 weeks         Ivan Garrett MD, MD  [facility]  [phone]

## 2022-01-14 NOTE — PAYOR COMM NOTE
--------------  CONTINUED STAY REVIEW-----REQUESTING ADDITIONAL DAY 1/14       Payor: Shelley Diane #:  [id number]  Authorization Number: [id number]     Admit date: 1/11/22  Admit time:  1:24 AM    Admitting Physician: Germán Andrew MD  Attend Creatinine Clearance: 139.8 mL/min (based on SCr of 0.77 mg/dL).     No results for input(s): PTP, INR in the last 168 hours.        COVID-19 Lab Results     COVID-19        Lab Results   Component Value Date     COVID19 Not Detected 01/10/2022     COVID19 DAY:  Ampicillin-Sulbactam Sodium (UNASYN) 3 g in sodium chloride 0.9% 100 mL IVPB-ADDV     Date Action Dose Route User    1/14/2022 0655 New Bag 3 g Intravenous Alfornia TAYLER Walton    1/13/2022 2212 New Bag 3 g Intravenous Ghadaia TAYLER Walton    1/13/2022 0126 injection 3,650 Units     Date Action Dose Route User    1/13/2022 1711 Given 3,650 Units Intravenous Kathy Perez RN      Insulin Aspart Pen (NOVOLOG) 100 UNIT/ML flexpen 1-68 Units     Date Action Dose Route User    1/14/2022 0855 Given 5 Units Subcut (last day)     Date/Time Temp Pulse Resp BP SpO2 Weight O2 Device O2 Flow Rate (L/min) [facility]    01/14/22 0900 98.1 °F (36.7 °C) 94 28 102/75 90 % — None (Room air) —     01/14/22 0800 — 91 31 117/80 92 % — None (Room air) —     01/14/22 0700 — 90 21 97/7 — — CR    01/13/22 0100 — 95 26 103/80 92 % — — — CR    01/13/22 0000 98.3 °F (36.8 °C) 94 28 108/81 92 % 266 lb 15.6 oz None (Room air) — CR        CIWA Scores (since admission)     None            Procedures:      Plan:

## 2022-01-14 NOTE — CM/SW NOTE
Spoke with RN. Pt is going to require IV abx at discharge. Spoke with Anushka Salazar at [facility]. Since pt is not fully vaccinated and not boosted, pt is unable to do IV abx in their office. Pt's option is home w/ IV abx through [facility]. [facility] [place].  P

## 2022-01-14 NOTE — DIETARY NOTE
[facility]    NUTRITION ASSESSMENT    Pt does not meet malnutrition criteria. NUTRITION INTERVENTION:    1. Meal and Snacks - monitor patient po intake. Encourage adequate po of appropriate diet.  Diet liberalized to 2200 Carb Controlled / Low NA di (266 lb 1.5 oz)   01/11/22 0030 120.9 kg (266 lb 8.6 oz)   01/10/22 1359 119.3 kg (263 lb)       Wt Readings from Last 10 Encounters:  01/14/22 : 125.4 kg (276 lb 7.3 oz)  01/07/22 : 120.7 kg (266 lb)  01/04/22 : 126.1 kg (278 lb)       NUTRITION:  Diet: O

## 2022-01-14 NOTE — CM/SW NOTE
[facility] [place] [facility] is able to accept pt for [facility] RN services. Notified / left Leonila Barrios a voicemail at [facility] to inform. Reserved in [address]. AVS updated.      [employer] [facility]  [address], [address]  Phone: [phone]  Fax: [phone]

## 2022-01-14 NOTE — PLAN OF CARE
Assumed care of pt. At [address]. Pt. Resting in bed. A & O x4. VSS. KESSLER. C/o mild to moderate L shoulder pain, somewhat relieved w/ PRN medication. Ambulating as tolerated. Voiding. Will continue to monitor closely.

## 2022-01-15 VITALS
OXYGEN SATURATION: 94 % | HEART RATE: 90 BPM | HEIGHT: 73 IN | TEMPERATURE: 97 F | DIASTOLIC BLOOD PRESSURE: 80 MMHG | RESPIRATION RATE: 27 BRPM | WEIGHT: 275.13 LBS | BODY MASS INDEX: 36.46 KG/M2 | SYSTOLIC BLOOD PRESSURE: 114 MMHG

## 2022-01-15 LAB
GLUCOSE BLD-MCNC: 140 MG/DL (ref 70–99)
GLUCOSE BLD-MCNC: 143 MG/DL (ref 70–99)
GLUCOSE BLD-MCNC: 151 MG/DL (ref 70–99)
GLUCOSE BLD-MCNC: 190 MG/DL (ref 70–99)
PLATELET # BLD AUTO: 492 10(3)UL (ref 150–450)

## 2022-01-15 PROCEDURE — 99239 HOSP IP/OBS DSCHRG MGMT >30: CPT | Performed by: HOSPITALIST

## 2022-01-15 RX ORDER — METOPROLOL SUCCINATE 50 MG/1
50 TABLET, EXTENDED RELEASE ORAL
Qty: 30 TABLET | Refills: 3 | Status: SHIPPED | OUTPATIENT
Start: 2022-01-15

## 2022-01-15 RX ORDER — ASPIRIN 81 MG/1
81 TABLET ORAL DAILY
Qty: 30 TABLET | Refills: 3 | Status: SHIPPED | OUTPATIENT
Start: 2022-01-16

## 2022-01-15 RX ORDER — INSULIN ASPART 100 [IU]/ML
INJECTION, SOLUTION INTRAVENOUS; SUBCUTANEOUS
Qty: 1 EACH | Refills: 0 | Status: SHIPPED | OUTPATIENT
Start: 2022-01-15

## 2022-01-15 RX ORDER — PEN NEEDLE, DIABETIC 32GX 5/32"
NEEDLE, DISPOSABLE MISCELLANEOUS
Qty: 100 EACH | Refills: 6 | Status: SHIPPED | OUTPATIENT
Start: 2022-01-15 | End: 2022-01-24 | Stop reason: CLARIF

## 2022-01-15 RX ORDER — BLOOD-GLUCOSE METER
EACH MISCELLANEOUS
Qty: 1 KIT | Refills: 0 | Status: SHIPPED | OUTPATIENT
Start: 2022-01-15 | End: 2022-01-24 | Stop reason: CLARIF

## 2022-01-15 RX ORDER — ATORVASTATIN CALCIUM 20 MG/1
20 TABLET, FILM COATED ORAL NIGHTLY
Qty: 30 TABLET | Refills: 3 | Status: SHIPPED | OUTPATIENT
Start: 2022-01-15

## 2022-01-15 RX ORDER — LOSARTAN POTASSIUM 25 MG/1
25 TABLET ORAL DAILY
Qty: 30 TABLET | Refills: 3 | Status: SHIPPED | OUTPATIENT
Start: 2022-01-16

## 2022-01-15 RX ORDER — INSULIN DETEMIR 100 [IU]/ML
18 INJECTION, SOLUTION SUBCUTANEOUS EVERY 12 HOURS
Qty: 1 EACH | Refills: 0 | Status: SHIPPED | OUTPATIENT
Start: 2022-01-15

## 2022-01-15 NOTE — CM/SW NOTE
In anticipation of discharge, spoke with on call nurse with [facility]--agency will be lorraine to accept and start care tomorrow morning.       Spoke with vandana, on call for [facility], updated of discharge--antibiotics and supplies to be delivered to patient's

## 2022-01-15 NOTE — CM/SW NOTE
xarelto script electronically sent to patient's [facility]--cost of xarelto with insurance $66.85--to give pt free 30 day and co savings card that needs to be activated

## 2022-01-15 NOTE — DISCHARGE SUMMARY
AB HOSPITALIST  DISCHARGE SUMMARY     Lenin Bartlett Patient Status:  Inpatient    1980 MRN [id number]   [facility] 6NE-A Attending Angel Sidhu MD   Hosp Day # 4 PCP No primary care provider on file.      Date of Admission: 1/10/2022 morbid obesity/metabolic with diabetes  -E cig smoker  -Medical management of distal CAD with aspirin and beta-blocker metoprolol succinate 50 mg p.o. daily and statin  -Losartan for moderately reduced ejection fraction and kidney function recovered  -Court tablet  Refills: 3     NovoLOG FlexPen 100 UNIT/ML Sopn  Generic drug: Insulin Aspart Pen      Test blood glucose 3 times daily before meals  Inject 1 unit of insulin for every 20 points blood glucose is greater than 140 mg/dL Inject insulin into the skin [place]      Follow-up appointment:   Tom Lew MD  [address] [place] [address] [phone]    On 2/2/2022  Telemedicine appointment    Marva Alvarez MD  [address] [place] [address] [phone] the back of your insurance card. Vital signs:  Temp:  [97.3 °F (36.3 °C)-98.2 °F (36.8 °C)] 97.3 °F (36.3 °C)  Pulse:  [84-95] 90  Resp:  [17-28] 27  BP: (104-119)/(67-89) 114/80    Physical Exam:    General: No acute distress.    Respi

## 2022-01-15 NOTE — PROGRESS NOTES
[facility]                INFECTIOUS DISEASE PROGRESS NOTE    Caryle Kudo Patient Status:  Inpatient    1980 MRN [id number]   [facility] 6NE-A Attending Tabatha Garcia MD   Hosp Day # 4 PCP No primary care provider on file. 76  --   --   --   --   --   --    LYMPH 10  --   --   --   --   --   --    MON 13  --   --   --   --   --   --    EOS 0  --   --   --   --   --   --    NRBC 1*  --   --   --   --   --   --     < > = values in this interval not displayed.          Recent [place] Bacterial pericarditis  Secondary to PREVOTELLA DENTICOLA  -recent dental abscess treated with clindamycni    Admitted with chest pain, large pericadial effusion  -sp pericardial drain  Prevotella DENTICola from anaerobic culture    Mural thrombosis on ant

## 2022-01-15 NOTE — PLAN OF CARE
Assumed care of pt this am, he is sitting up in chair, is neurologically intact, with no c/o pain or discomfort at this time. Left Picc line infusing 0.9NS. order rcvd for patient to discharge home with 6wks of IV antibiotics managed by ID and home health.

## 2022-01-15 NOTE — PLAN OF CARE
Assumed care of pt. At [address]. Pt. Up in chair. A & O x4. VSS. KESSLER. Denies of any pain. PICC for home abx. Transfer orders.

## 2022-01-15 NOTE — PROGRESS NOTES
[facility]  Progress Note    Cintia Elliott Patient Status:  Inpatient    1980 MRN [id number]   [facility] 6NE-A Attending Anayeli Fermin MD   Hosp Day # 4 PCP No primary care provider on file. the case  · Newly diagnosed type II DM 5 days prior to admission and was started on metformin -hemoglobin A1c -glucose 459 on admission  · Acute renal insufficiency - creatinine 1.44 on admission normalized after pericardiocentesis  · D dimer positive from Value Date    WBC 13.0 01/14/2022    HGB 12.9 01/14/2022    HCT 41.1 01/14/2022    .0 01/15/2022    CREATSERUM 0.97 01/14/2022    BUN 10 01/14/2022     01/14/2022    K 4.7 01/14/2022    CL 96 01/14/2022    CO2 27.0 01/14/2022     01/14/ Mayelin Lopez MD  1/15/2022  8:30 AM  Level 3

## 2022-01-17 ENCOUNTER — PATIENT OUTREACH (OUTPATIENT)
Dept: CASE MANAGEMENT | Age: 42
End: 2022-01-17

## 2022-01-17 PROBLEM — E11.9 TYPE 2 DIABETES MELLITUS WITHOUT COMPLICATION, WITHOUT LONG-TERM CURRENT USE OF INSULIN (HCC): Status: ACTIVE | Noted: 2022-01-17

## 2022-01-17 NOTE — PAYOR COMM NOTE
Payor:  Sherie Chowdhury #:  [id number]  Authorization Number: [id number]     Admit date: 1/11/22  Admit time:   1:24 AM  Discharge Date: 1/15/2022  4:34 PM

## 2022-01-17 NOTE — PROGRESS NOTES
The patient is requesting assistance with scheduling for the following provider:     Follow up with Vicki Ernst MD in 1 week(s)  Specialty: CARDIOLOGY  office will call you to schedule  [place]  [address]

## 2022-01-17 NOTE — PROGRESS NOTES
Initial Post Discharge Follow Up   Discharge Date: 1/15/22  Contact Date: 1/17/2022    Consent Verification:  Assessment Completed With: Patient  HIPAA Verified? Yes    Discharge Dx:      Takotsubo cardiomyopathy    Status post emergent coronary angiogr shoulders/chest/back/arms/legs, infection at the PICC line catheter site (pain, redness, drainage), or wheezing. The patient also denies any hematuria, melena, hematochezia, or bleeding from the gums, nose or cuts.  The patient has been checking glucose lev MG Tabs  Commonly known as: GLUCOPHAGE          omeprazole 20 MG Cpdr  Commonly known as: PRILOSEC         Current Outpatient Medications   Medication Sig Dispense Refill   • aspirin 81 MG Oral Tab EC Take 1 tablet (81 mg total) by mouth daily.  30 tablet 3 with the patient in detail including possible side effects, and the importance of refraining from NSAID use.    • Did you  your discharge medications when you left the hospital? Yes  • May I go over your medications with you to make sure we are not m appointments:      Your appointments     Date & Time Appointment Department [facility])    Jan 19, 2022  5:30 PM CST Step 1/Initial Visit with Kamla Powers, RN, CDE [facility], [address] ([facility])    W your follow up appointments? no; An outreach was sent to the JEAN MARIE Mendoza for assistance scheduling the recommended cardiology HFU appointment. Is there any reason as to why you cannot make your appointments?    No     NCM Reviewed upcoming Sp blood glucose meter/supplies if applicable.

## 2022-01-18 NOTE — PROGRESS NOTES
1st attempt [facility] apt request    [address] [address]  [phone]  Apt made for Tues. Jan. 25th @10am    Patient notified

## 2022-01-19 ENCOUNTER — NURSE ONLY (OUTPATIENT)
Dept: ENDOCRINOLOGY CLINIC | Facility: CLINIC | Age: 42
End: 2022-01-19
Payer: COMMERCIAL

## 2022-01-19 VITALS — WEIGHT: 283 LBS | BODY MASS INDEX: 37 KG/M2

## 2022-01-19 DIAGNOSIS — E11.9 TYPE 2 DIABETES MELLITUS WITHOUT COMPLICATION, WITHOUT LONG-TERM CURRENT USE OF INSULIN (HCC): Primary | ICD-10-CM

## 2022-01-19 PROCEDURE — G0108 DIAB MANAGE TRN  PER INDIV: HCPCS | Performed by: DIETITIAN, REGISTERED

## 2022-01-19 RX ORDER — BLOOD SUGAR DIAGNOSTIC
STRIP MISCELLANEOUS
Qty: 150 STRIP | Refills: 11 | Status: SHIPPED | OUTPATIENT
Start: 2022-01-19

## 2022-01-19 RX ORDER — LANCETS 33 GAUGE
1 EACH MISCELLANEOUS 4 TIMES DAILY
Qty: 200 EACH | Refills: 11 | Status: SHIPPED | OUTPATIENT
Start: 2022-01-19 | End: 2022-01-24 | Stop reason: CLARIF

## 2022-01-20 NOTE — PROGRESS NOTES
Rimma Haritha  : 1980 attended Step 1 Diabetic Education:  Pt.  Accompanied by wife to the visit  Date: 2022  Referring Provider: MARIAH Ryan  Start time: 5:30pm  End time: 6:30pm    Wt 283 lb   BMI 37.34 kg/m²     HgbA1C (%)   Date Valu and target goals:  Fasting / Pre-meal  2 Hour Post-prandial 140-180    SMBG 22 to 22  Fastin-399 mg/dL    Prelunch:198-486 mg/dL     Predinner: 239-445 mg/dL     2 hr postdinner: 260 mg/dl    Problem Solving: Prevention,detection and

## 2022-01-24 ENCOUNTER — DIABETIC EDUCATION (OUTPATIENT)
Dept: ENDOCRINOLOGY CLINIC | Facility: CLINIC | Age: 42
End: 2022-01-24
Payer: COMMERCIAL

## 2022-01-24 ENCOUNTER — OFFICE VISIT (OUTPATIENT)
Dept: INTERNAL MEDICINE CLINIC | Facility: CLINIC | Age: 42
End: 2022-01-24
Payer: COMMERCIAL

## 2022-01-24 VITALS
RESPIRATION RATE: 18 BRPM | OXYGEN SATURATION: 100 % | BODY MASS INDEX: 35.25 KG/M2 | DIASTOLIC BLOOD PRESSURE: 90 MMHG | HEART RATE: 97 BPM | HEIGHT: 73 IN | SYSTOLIC BLOOD PRESSURE: 126 MMHG | TEMPERATURE: 97 F | WEIGHT: 266 LBS

## 2022-01-24 DIAGNOSIS — Z79.4 TYPE 2 DIABETES MELLITUS WITHOUT COMPLICATION, WITH LONG-TERM CURRENT USE OF INSULIN (HCC): ICD-10-CM

## 2022-01-24 DIAGNOSIS — E11.9 TYPE 2 DIABETES MELLITUS WITHOUT COMPLICATION, WITH LONG-TERM CURRENT USE OF INSULIN (HCC): ICD-10-CM

## 2022-01-24 DIAGNOSIS — E11.9 TYPE 2 DIABETES MELLITUS WITHOUT COMPLICATION, WITHOUT LONG-TERM CURRENT USE OF INSULIN (HCC): Primary | ICD-10-CM

## 2022-01-24 DIAGNOSIS — I30.1 BACTERIAL PERICARDITIS: Primary | ICD-10-CM

## 2022-01-24 DIAGNOSIS — I23.6 LEFT VENTRICULAR MURAL THROMBOSIS FOLLOWING MYOCARDIAL INFARCTION (HCC): ICD-10-CM

## 2022-01-24 DIAGNOSIS — K04.7 DENTAL ABSCESS: ICD-10-CM

## 2022-01-24 DIAGNOSIS — I51.81 TAKOTSUBO CARDIOMYOPATHY: ICD-10-CM

## 2022-01-24 DIAGNOSIS — I31.4 PERICARDIAL EFFUSION WITH CARDIAC TAMPONADE: ICD-10-CM

## 2022-01-24 DIAGNOSIS — R94.31 ST ELEVATION: ICD-10-CM

## 2022-01-24 DIAGNOSIS — I31.3 PERICARDIAL EFFUSION WITH CARDIAC TAMPONADE: ICD-10-CM

## 2022-01-24 DIAGNOSIS — G47.09 OTHER INSOMNIA: ICD-10-CM

## 2022-01-24 DIAGNOSIS — B96.89 BACTERIAL PERICARDITIS: Primary | ICD-10-CM

## 2022-01-24 PROCEDURE — 3080F DIAST BP >= 90 MM HG: CPT | Performed by: NURSE PRACTITIONER

## 2022-01-24 PROCEDURE — 97802 MEDICAL NUTRITION INDIV IN: CPT | Performed by: DIETITIAN, REGISTERED

## 2022-01-24 PROCEDURE — 3008F BODY MASS INDEX DOCD: CPT | Performed by: NURSE PRACTITIONER

## 2022-01-24 PROCEDURE — 99214 OFFICE O/P EST MOD 30 MIN: CPT | Performed by: NURSE PRACTITIONER

## 2022-01-24 PROCEDURE — 3074F SYST BP LT 130 MM HG: CPT | Performed by: NURSE PRACTITIONER

## 2022-01-24 RX ORDER — CHLORAL HYDRATE 500 MG
1000 CAPSULE ORAL DAILY
COMMUNITY

## 2022-01-24 RX ORDER — ACETAMINOPHEN 500 MG
TABLET ORAL EVERY 6 HOURS PRN
COMMUNITY

## 2022-01-24 RX ORDER — MULTIVIT-MIN/IRON FUM/FOLIC AC 7.5 MG-4
1 TABLET ORAL DAILY
COMMUNITY

## 2022-01-24 NOTE — PROGRESS NOTES
[facility] 6      HISTORY   CHIEF COMPLAINT: HFU-Bacterial pericarditis, pericardial effusion with tamponade, LV mural thrombosis, takotsubo cardiomyopathy, STEMI, DMII uncontrolled, dental abscess, and insomnia. tolerating without difficulties. He was discharged on DOAC Xarelto for mural thrombus and denies any bleeding. His right groin site is D/I with some bruising present and no s/s of infection.  He is to continue H/H RN for routine PICC line care and lab draws Reported on 1/19/2022), Disp: 1 kit, Rfl: 0  Insulin Pen Needle (BD PEN NEEDLE DUARTE U/F) 32G X 4 MM Does not apply Misc, Use a new pen needle with each injection as directed by your doctor, Disp: 100 each, Rfl: 6  sodium chloride 0.9% SOLN 100 mL with Ampi exertion  CARDIOVASCULAR: denies syncope, chest pain, fatigue, palpitations   GI: denies abdominal pain, melena, constipation, diarrhea, nausea, vomiting   MUSCULOSKELETAL: + joint pain, states normal range of motion of extremities  NEUROLOGIC: denies conf cardiomyopathy with reduced EF of 30%  · Follow-up with cardiology Dr. Priscilla Bryan in 1 week-office to call patient to set up appointment    3.  Left ventricular mural thrombosis following myocardial infarction [facility])  · Discharge on DOAC  · Continue:   · Jasmine Haynes fatty acids 1000 MG Oral Cap, Take 1,000 mg by mouth daily. , Disp: , Rfl:   Glucose Blood (ONETOUCH VERIO) In Vitro Strip, Test 4 times daily, Disp: 150 strip, Rfl: 11  aspirin 81 MG Oral Tab EC, Take 1 tablet (81 mg total) by mouth daily. , Disp: 30 tablet nightly., Disp: 30 tablet, Rfl: 3  insulin detemir (LEVEMIR FLEXTOUCH) 100 UNIT/ML Subcutaneous Solution Pen-injector, Inject 18 Units into the skin every 12 (twelve) hours. , Disp: 1 each, Rfl: 0  sodium chloride 0.9% SOLN 100 mL with Ampicillin-Sulbactam of discharge to 30 days:   · Number of Possible Diagnoses and/or Management Options: moderate  · Amount and/or Complexity of Data to Be Reviewed: moderate  · Risk of Significant Complications, Morbidity, and/or Mortality: moderate    Overall Risk:   modera

## 2022-01-24 NOTE — PROGRESS NOTES
TRANSITIONAL CARE CLINIC PHARMACIST MEDICATION RECONCILIATION        Cintia Elliott MRN [id number]    1980 PCP No primary care provider on file.        Comments: Medication history completed by the [address] [place] [address] Pharmacist with the patient in SOLN 100 mL with Ampicillin-Sulbactam Sodium 3 (2-1) g SOLR 3 g Inject 3 g into the vein every 8 (eight) hours.    • Insulin Aspart Pen (NOVOLOG FLEXPEN) 100 UNIT/ML Subcutaneous Solution Pen-injector Test blood glucose 3 times daily before meals  Inject 1

## 2022-01-24 NOTE — PROGRESS NOTES
Lenin Bartlett   1/1/1980 was seen for Medical Nutrition Therapy with Diabetes:    1/24/2022  Referring Provider: Alexandria Eastman  Start time: 11:00 End time: 12:00    HgbA1C (%)   Date Value   01/04/2022 9.6 (H)     With wife.    Weight: 201# in 2015; gained examples of heart healthy, balanced meals with 30-45 grams of carbohydrate/meal.     Reviewed principles for heart healthy diet: reduced saturated fat, reduced sodium and high fiber.     Exercise: Discussed benefits of regular physical activity and goal to

## 2022-01-24 NOTE — PATIENT INSTRUCTIONS
Patient Instructions: 1. [facility] [id number]  [phone]    2.  Dr. Alesha Flores 2/2 at [facility] Physician  [address] [place] [address] · ~24.2 mi   ([phone])

## 2022-01-26 PROBLEM — I30.1: Status: ACTIVE | Noted: 2022-01-26

## 2022-01-26 PROBLEM — I51.81 TAKOTSUBO CARDIOMYOPATHY: Status: ACTIVE | Noted: 2022-01-26

## 2022-01-26 PROBLEM — I23.6: Status: ACTIVE | Noted: 2022-01-26

## 2022-01-26 PROBLEM — G47.09 OTHER INSOMNIA: Status: ACTIVE | Noted: 2022-01-26

## 2022-01-26 PROBLEM — Z79.4 TYPE 2 DIABETES MELLITUS WITHOUT COMPLICATION, WITH LONG-TERM CURRENT USE OF INSULIN (HCC): Status: ACTIVE | Noted: 2022-01-17

## 2022-01-26 PROBLEM — I31.4 PERICARDIAL EFFUSION WITH CARDIAC TAMPONADE: Status: ACTIVE | Noted: 2022-01-26

## 2022-01-26 PROBLEM — B96.89: Status: ACTIVE | Noted: 2022-01-26

## 2022-01-26 PROBLEM — E11.9 TYPE 2 DIABETES MELLITUS WITHOUT COMPLICATION, WITH LONG-TERM CURRENT USE OF INSULIN (HCC): Status: ACTIVE | Noted: 2022-01-17

## 2022-01-26 PROBLEM — I31.3 PERICARDIAL EFFUSION WITH CARDIAC TAMPONADE: Status: ACTIVE | Noted: 2022-01-26

## 2022-02-12 ENCOUNTER — HOSPITAL ENCOUNTER (OUTPATIENT)
Dept: CT IMAGING | Age: 42
Discharge: HOME | End: 2022-02-12
Attending: INTERNAL MEDICINE
Payer: COMMERCIAL

## 2022-02-12 DIAGNOSIS — J90 PLEURAL EFFUSION: ICD-10-CM

## 2022-02-12 PROCEDURE — 71250 CT THORAX DX C-: CPT | Performed by: INTERNAL MEDICINE

## 2022-02-16 ENCOUNTER — PATIENT OUTREACH (OUTPATIENT)
Dept: INFECTIOUS DISEASE | Facility: CLINIC | Age: 42
End: 2022-02-16

## 2022-02-16 NOTE — PROGRESS NOTES
ECHO done by Dr. Kecia Aponte showed pleural effusion; CT mod/large left effusion; awaiting pulmonary evaluation, thoracentecis; was scheduled to complete ivabx 2/20; extended pending evaluation. 40

## 2022-02-25 ENCOUNTER — LAB ENCOUNTER (OUTPATIENT)
Dept: LAB | Age: 42
End: 2022-02-25
Attending: INTERNAL MEDICINE
Payer: COMMERCIAL

## 2022-02-25 ENCOUNTER — ORDER TRANSCRIPTION (OUTPATIENT)
Dept: ADMINISTRATIVE | Facility: HOSPITAL | Age: 42
End: 2022-02-25

## 2022-02-25 DIAGNOSIS — Z11.59 ENCOUNTER FOR SCREENING FOR OTHER VIRAL DISEASES: ICD-10-CM

## 2022-02-25 DIAGNOSIS — J90 PLEURAL EFFUSION: ICD-10-CM

## 2022-02-25 DIAGNOSIS — Z01.818 PREPROCEDURAL EXAMINATION: ICD-10-CM

## 2022-02-26 LAB — SARS-COV-2 RNA RESP QL NAA+PROBE: NOT DETECTED

## 2022-02-28 ENCOUNTER — HOSPITAL ENCOUNTER (OUTPATIENT)
Dept: ULTRASOUND IMAGING | Facility: HOSPITAL | Age: 42
Discharge: HOME | End: 2022-02-28
Attending: INTERNAL MEDICINE
Payer: COMMERCIAL

## 2022-02-28 ENCOUNTER — HOSPITAL ENCOUNTER (OUTPATIENT)
Dept: GENERAL RADIOLOGY | Facility: HOSPITAL | Age: 42
Discharge: HOME | End: 2022-02-28
Attending: INTERNAL MEDICINE
Payer: COMMERCIAL

## 2022-02-28 DIAGNOSIS — J90 PLEURAL EFFUSION: ICD-10-CM

## 2022-02-28 LAB
BASOPHILS NFR PLR: 0 %
CHOLEST PLR-MCNC: <50 MG/DL
COLOR FLD: YELLOW
EOSINOPHIL NFR PLR: 0 %
GLUCOSE PLR-MCNC: 99 MG/DL
HCT VFR PLR CALC: <5 %
LDH FLD L TO P-CCNC: 146 U/L
LYMPHOCYTES NFR PLR: 51 %
MESOTHL CELL NFR PLR: 3 %
MONOS+MACROS NFR PLR: 14 %
NEUTROPHILS NFR PLR: 32 %
PH PLR: 7.5 [PH] (ref 7.2–7.5)
PROT PLR-MCNC: 3.7 G/DL
RBC PLEURAL FLUID: <3000 /MM3
TOTAL CELLS COUNTED FLD: 100
TRIGL PLR-MCNC: 26 MG/DL
TURBIDITY CSF QL: CLEAR
WBC # FLD: 586 /MM3

## 2022-02-28 PROCEDURE — 88305 TISSUE EXAM BY PATHOLOGIST: CPT | Performed by: INTERNAL MEDICINE

## 2022-02-28 PROCEDURE — 87070 CULTURE OTHR SPECIMN AEROBIC: CPT | Performed by: INTERNAL MEDICINE

## 2022-02-28 PROCEDURE — 82945 GLUCOSE OTHER FLUID: CPT | Performed by: INTERNAL MEDICINE

## 2022-02-28 PROCEDURE — 85014 HEMATOCRIT: CPT | Performed by: INTERNAL MEDICINE

## 2022-02-28 PROCEDURE — 84157 ASSAY OF PROTEIN OTHER: CPT | Performed by: INTERNAL MEDICINE

## 2022-02-28 PROCEDURE — 83615 LACTATE (LD) (LDH) ENZYME: CPT | Performed by: INTERNAL MEDICINE

## 2022-02-28 PROCEDURE — 87205 SMEAR GRAM STAIN: CPT | Performed by: INTERNAL MEDICINE

## 2022-02-28 PROCEDURE — 89050 BODY FLUID CELL COUNT: CPT | Performed by: INTERNAL MEDICINE

## 2022-02-28 PROCEDURE — 84478 ASSAY OF TRIGLYCERIDES: CPT | Performed by: INTERNAL MEDICINE

## 2022-02-28 PROCEDURE — 88108 CYTOPATH CONCENTRATE TECH: CPT | Performed by: INTERNAL MEDICINE

## 2022-02-28 PROCEDURE — 82800 BLOOD PH: CPT | Performed by: INTERNAL MEDICINE

## 2022-02-28 PROCEDURE — 82465 ASSAY BLD/SERUM CHOLESTEROL: CPT | Performed by: INTERNAL MEDICINE

## 2022-02-28 PROCEDURE — 32555 ASPIRATE PLEURA W/ IMAGING: CPT | Performed by: INTERNAL MEDICINE

## 2022-02-28 PROCEDURE — 89051 BODY FLUID CELL COUNT: CPT | Performed by: INTERNAL MEDICINE

## 2022-02-28 NOTE — OPERATIVE REPORT
[facility] Patient Status:  Outpatient    1980 MRN [id number]   Location [address] [place] [address] Attending Silvina Mae MD   Hosp Day # 0 PCP Keke Sims MD       Date of Procedure: 22      Pre-operative Diagnosis: left pleural effusion     Post-operative Diagnosis: left pleural effusion     Procedure:  left Ultrasound Guided Thoracentesis     Medications:  10 mL Local 1% Lidocaine    Indication: The patient is a 36year old -year-old Male, who was found to have infectious pericarditis and enlarging left pleural effusion. Consent:  Risks of procedure to include infection, bleeding and pneumothorax explained in detail to patient. Consent taken. Time out performed. Procedure: The procedure was performed in the ultrasound department. Using the curvilinear probe, the left chest was evaluated which demonstrated moderate sized effusion with identification of diaphragm and atelectatic lung. The skin site was marked and reconfirmed using the ultrasound. The area was prepped and draped in sterile fashion using chlorhexidine and sterile drape. 10 cc 1% Lidocaine without epinephrine was instilled subcutaneously in the mid scapular line between the 5th and 6th intercostal space. A 3 mm subcutaneous stab incision was performed at the site. Then the thoracentesis needle and catheter were both advanced gently into the pleural space and the needle was withdrawn, keeping the thoracentesis catheter within the pleural space. Manual aspiration was then performed and in total 1100 ml of cloudy yellow pleural fluid was removed. Fluid was sent for cytology, cultures, and chemistries. Aspiration was stopped due to cessation of pleural fluid flow. Postprocedure chest ultrasound of the right hemithorax revealed small residual pleural effusion and the presence of sliding lung. The patient tolerated the procedure well and without any complications.       Condition:  Stable     [facility] Mikayla Clayton, [address] [facility]

## 2022-03-03 LAB — NON GYNE INTERPRETATION: NEGATIVE

## (undated) DIAGNOSIS — Z01.818 PREPROCEDURAL EXAMINATION: ICD-10-CM

## (undated) DIAGNOSIS — J90 PLEURAL EFFUSION: Primary | ICD-10-CM

## (undated) DIAGNOSIS — Z11.59 ENCOUNTER FOR SCREENING FOR OTHER VIRAL DISEASES: ICD-10-CM